# Patient Record
Sex: MALE | Race: WHITE | NOT HISPANIC OR LATINO | Employment: OTHER | URBAN - METROPOLITAN AREA
[De-identification: names, ages, dates, MRNs, and addresses within clinical notes are randomized per-mention and may not be internally consistent; named-entity substitution may affect disease eponyms.]

---

## 2023-04-14 LAB
ANION GAP IN SER/PLAS: 12 MMOL/L (ref 10–20)
CALCIUM (MG/DL) IN SER/PLAS: 8.8 MG/DL (ref 8.6–10.3)
CARBON DIOXIDE, TOTAL (MMOL/L) IN SER/PLAS: 28 MMOL/L (ref 21–32)
CHLORIDE (MMOL/L) IN SER/PLAS: 106 MMOL/L (ref 98–107)
CHOLESTEROL (MG/DL) IN SER/PLAS: 106 MG/DL (ref 0–199)
CHOLESTEROL IN HDL (MG/DL) IN SER/PLAS: 25.8 MG/DL
CHOLESTEROL/HDL RATIO: 4.1
CREATININE (MG/DL) IN SER/PLAS: 0.76 MG/DL (ref 0.5–1.3)
GFR MALE: >90 ML/MIN/1.73M2
GLUCOSE (MG/DL) IN SER/PLAS: 121 MG/DL (ref 74–99)
LDL: 55 MG/DL (ref 0–99)
POTASSIUM (MMOL/L) IN SER/PLAS: 3.9 MMOL/L (ref 3.5–5.3)
SODIUM (MMOL/L) IN SER/PLAS: 142 MMOL/L (ref 136–145)
TRIGLYCERIDE (MG/DL) IN SER/PLAS: 128 MG/DL (ref 0–149)
UREA NITROGEN (MG/DL) IN SER/PLAS: 15 MG/DL (ref 6–23)
VLDL: 26 MG/DL (ref 0–40)

## 2023-05-13 DIAGNOSIS — E78.2 MIXED HYPERLIPIDEMIA: ICD-10-CM

## 2023-05-13 DIAGNOSIS — I10 BENIGN ESSENTIAL HYPERTENSION: Primary | ICD-10-CM

## 2023-05-15 PROBLEM — N40.0 BPH WITHOUT URINARY OBSTRUCTION: Status: ACTIVE | Noted: 2023-05-15

## 2023-05-15 PROBLEM — E78.5 HYPERLIPEMIA: Status: ACTIVE | Noted: 2023-05-15

## 2023-05-15 PROBLEM — R73.03 PRE-DIABETES: Status: RESOLVED | Noted: 2023-05-15 | Resolved: 2023-05-15

## 2023-05-15 PROBLEM — E11.9 DIET-CONTROLLED DIABETES MELLITUS (MULTI): Status: ACTIVE | Noted: 2023-05-15

## 2023-05-15 PROBLEM — I10 BENIGN ESSENTIAL HYPERTENSION: Status: ACTIVE | Noted: 2023-05-15

## 2023-05-15 PROBLEM — R73.9 HYPERGLYCEMIA: Status: RESOLVED | Noted: 2023-05-15 | Resolved: 2023-05-15

## 2023-05-15 RX ORDER — METOPROLOL TARTRATE 50 MG/1
TABLET ORAL
Qty: 180 TABLET | Refills: 3 | Status: SHIPPED | OUTPATIENT
Start: 2023-05-15 | End: 2024-01-18 | Stop reason: SDUPTHER

## 2023-05-15 RX ORDER — ATORVASTATIN CALCIUM 40 MG/1
TABLET, FILM COATED ORAL
Qty: 90 TABLET | Refills: 3 | Status: SHIPPED | OUTPATIENT
Start: 2023-05-15 | End: 2024-05-23 | Stop reason: SDUPTHER

## 2023-06-19 DIAGNOSIS — I10 BENIGN ESSENTIAL HYPERTENSION: Primary | ICD-10-CM

## 2023-06-19 RX ORDER — LOSARTAN POTASSIUM AND HYDROCHLOROTHIAZIDE 12.5; 1 MG/1; MG/1
TABLET ORAL
Qty: 90 TABLET | Refills: 3 | Status: SHIPPED | OUTPATIENT
Start: 2023-06-19 | End: 2024-01-18 | Stop reason: SDUPTHER

## 2023-07-20 ENCOUNTER — TELEPHONE (OUTPATIENT)
Dept: PRIMARY CARE | Facility: CLINIC | Age: 68
End: 2023-07-20
Payer: MEDICARE

## 2023-07-20 DIAGNOSIS — Z12.11 ENCOUNTER FOR SCREENING COLONOSCOPY: Primary | ICD-10-CM

## 2023-07-20 NOTE — TELEPHONE ENCOUNTER
Patient is stating that Dr. Kim needs a  referral be sent for his colonoscopy.    Fax [phone]

## 2023-09-09 PROBLEM — R35.1 NOCTURIA: Status: ACTIVE | Noted: 2023-09-09

## 2023-09-09 PROBLEM — M75.42 IMPINGEMENT SYNDROME OF LEFT SHOULDER: Status: ACTIVE | Noted: 2023-09-09

## 2023-09-09 PROBLEM — G62.9 PERIPHERAL NEUROPATHY: Status: ACTIVE | Noted: 2023-09-09

## 2023-09-09 PROBLEM — G47.33 OBSTRUCTIVE SLEEP APNEA ON CPAP: Status: ACTIVE | Noted: 2023-09-09

## 2023-09-09 PROBLEM — R51.9 FREQUENT HEADACHES: Status: ACTIVE | Noted: 2023-09-09

## 2023-09-09 PROBLEM — R60.0 BILATERAL EDEMA OF LOWER EXTREMITY: Status: ACTIVE | Noted: 2023-09-09

## 2023-09-09 PROBLEM — E55.9 VITAMIN D DEFICIENCY: Status: ACTIVE | Noted: 2023-09-09

## 2023-09-09 PROBLEM — N52.9 ERECTILE DYSFUNCTION: Status: ACTIVE | Noted: 2023-09-09

## 2023-09-09 PROBLEM — J45.909 REACTIVE AIRWAY DISEASE (HHS-HCC): Status: ACTIVE | Noted: 2023-09-09

## 2023-09-09 PROBLEM — R06.2 WHEEZING: Status: ACTIVE | Noted: 2023-09-09

## 2023-09-09 PROBLEM — E78.6 LOW HDL (UNDER 40): Status: ACTIVE | Noted: 2023-09-09

## 2023-09-09 PROBLEM — M75.41 IMPINGEMENT SYNDROME OF RIGHT SHOULDER: Status: ACTIVE | Noted: 2023-09-09

## 2023-09-09 PROBLEM — R32 URINARY INCONTINENCE: Status: ACTIVE | Noted: 2023-09-09

## 2023-09-09 PROBLEM — Z86.010 HISTORY OF COLON POLYPS: Status: ACTIVE | Noted: 2023-09-09

## 2023-09-09 PROBLEM — Z95.5 PRESENCE OF STENT IN LAD CORONARY ARTERY: Status: ACTIVE | Noted: 2023-09-09

## 2023-09-09 PROBLEM — I77.810 MILD ASCENDING AORTA DILATATION (CMS-HCC): Status: ACTIVE | Noted: 2023-09-09

## 2023-09-09 PROBLEM — R07.81 RIB PAIN ON LEFT SIDE: Status: ACTIVE | Noted: 2023-09-09

## 2023-09-09 PROBLEM — R06.09 DYSPNEA ON MINIMAL EXERTION: Status: ACTIVE | Noted: 2023-09-09

## 2023-09-09 PROBLEM — M67.819 TENDINOSIS OF ROTATOR CUFF: Status: ACTIVE | Noted: 2023-09-09

## 2023-09-09 PROBLEM — M25.519 SHOULDER PAIN: Status: ACTIVE | Noted: 2023-09-09

## 2023-09-09 PROBLEM — G25.81 RESTLESS LEGS SYNDROME: Status: ACTIVE | Noted: 2023-09-09

## 2023-09-09 PROBLEM — I25.10 CORONARY ARTERY DISEASE: Status: ACTIVE | Noted: 2023-09-09

## 2023-09-09 PROBLEM — N39.41 SENSORY URGE INCONTINENCE: Status: ACTIVE | Noted: 2023-09-09

## 2023-09-09 PROBLEM — R35.0 URINE FREQUENCY: Status: ACTIVE | Noted: 2023-09-09

## 2023-09-09 PROBLEM — Z86.0100 HISTORY OF COLON POLYPS: Status: ACTIVE | Noted: 2023-09-09

## 2023-09-09 RX ORDER — VIBEGRON 75 MG/1
1 TABLET, FILM COATED ORAL DAILY
COMMUNITY
Start: 2022-09-23

## 2023-09-09 RX ORDER — METHYLPREDNISOLONE 4 MG/1
TABLET ORAL
COMMUNITY
Start: 2022-10-04 | End: 2024-04-02 | Stop reason: WASHOUT

## 2023-09-09 RX ORDER — MIRABEGRON 50 MG/1
1 TABLET, EXTENDED RELEASE ORAL DAILY
COMMUNITY
Start: 2022-08-03 | End: 2024-04-02 | Stop reason: WASHOUT

## 2023-09-09 RX ORDER — OXYBUTYNIN CHLORIDE 5 MG/1
1 TABLET, EXTENDED RELEASE ORAL DAILY
COMMUNITY
Start: 2021-08-31 | End: 2024-05-23 | Stop reason: WASHOUT

## 2023-09-09 RX ORDER — ASCORBIC ACID 500 MG
2 TABLET ORAL DAILY
COMMUNITY

## 2023-09-09 RX ORDER — FLUTICASONE PROPIONATE 50 MCG
SPRAY, SUSPENSION (ML) NASAL DAILY
COMMUNITY
Start: 2022-06-28 | End: 2024-05-23 | Stop reason: WASHOUT

## 2023-09-09 RX ORDER — ERGOCALCIFEROL 1.25 MG/1
1 CAPSULE ORAL WEEKLY
COMMUNITY
Start: 2019-04-05 | End: 2024-05-23 | Stop reason: SDUPTHER

## 2023-09-09 RX ORDER — ALBUTEROL SULFATE 90 UG/1
2 AEROSOL, METERED RESPIRATORY (INHALATION)
COMMUNITY
Start: 2021-08-31 | End: 2024-05-23 | Stop reason: WASHOUT

## 2023-09-09 RX ORDER — MULTIVITAMIN
1 TABLET ORAL DAILY
COMMUNITY
End: 2024-05-23 | Stop reason: WASHOUT

## 2023-09-09 RX ORDER — SILDENAFIL 100 MG/1
TABLET, FILM COATED ORAL AS NEEDED
COMMUNITY
Start: 2021-08-31

## 2023-09-09 RX ORDER — POLYETHYLENE GLYCOL 3350, SODIUM CHLORIDE, SODIUM BICARBONATE, POTASSIUM CHLORIDE 420; 11.2; 5.72; 1.48 G/4L; G/4L; G/4L; G/4L
4000 POWDER, FOR SOLUTION ORAL ONCE
COMMUNITY
End: 2024-05-23 | Stop reason: WASHOUT

## 2023-09-09 RX ORDER — AMLODIPINE BESYLATE 10 MG/1
1 TABLET ORAL DAILY
COMMUNITY
Start: 2018-04-23 | End: 2023-10-12 | Stop reason: SINTOL

## 2023-09-09 RX ORDER — ASPIRIN 81 MG/1
1 TABLET ORAL DAILY
COMMUNITY
Start: 2019-02-21

## 2023-09-09 RX ORDER — SPIRONOLACTONE 25 MG/1
1 TABLET ORAL DAILY
COMMUNITY
Start: 2023-04-06 | End: 2024-04-02 | Stop reason: SDUPTHER

## 2023-09-26 ENCOUNTER — HOSPITAL ENCOUNTER (OUTPATIENT)
Dept: DATA CONVERSION | Facility: HOSPITAL | Age: 68
End: 2023-09-26
Attending: INTERNAL MEDICINE | Admitting: INTERNAL MEDICINE
Payer: MEDICARE

## 2023-09-26 DIAGNOSIS — Z86.010 PERSONAL HISTORY OF COLONIC POLYPS: ICD-10-CM

## 2023-09-26 DIAGNOSIS — D12.4 BENIGN NEOPLASM OF DESCENDING COLON: ICD-10-CM

## 2023-09-26 DIAGNOSIS — K57.30 DIVERTICULOSIS OF LARGE INTESTINE WITHOUT PERFORATION OR ABSCESS WITHOUT BLEEDING: ICD-10-CM

## 2023-09-26 DIAGNOSIS — Z12.11 ENCOUNTER FOR SCREENING FOR MALIGNANT NEOPLASM OF COLON: ICD-10-CM

## 2023-09-26 DIAGNOSIS — K64.0 FIRST DEGREE HEMORRHOIDS: ICD-10-CM

## 2023-09-26 DIAGNOSIS — Z53.8 PROCEDURE AND TREATMENT NOT CARRIED OUT FOR OTHER REASONS: ICD-10-CM

## 2023-09-26 DIAGNOSIS — Z80.0 FAMILY HISTORY OF MALIGNANT NEOPLASM OF DIGESTIVE ORGANS: ICD-10-CM

## 2023-09-29 VITALS — HEIGHT: 68 IN | BODY MASS INDEX: 35.42 KG/M2 | WEIGHT: 233.69 LBS

## 2023-10-02 LAB
COMPLETE PATHOLOGY REPORT: NORMAL
CONVERTED CLINICAL DIAGNOSIS-HISTORY: NORMAL
CONVERTED FINAL DIAGNOSIS: NORMAL
CONVERTED FINAL REPORT PDF LINK TO COPY AND PASTE: NORMAL
CONVERTED GROSS DESCRIPTION: NORMAL
CONVERTED MICROSCOPIC DESCRIPTION: NORMAL

## 2023-10-12 ENCOUNTER — OFFICE VISIT (OUTPATIENT)
Dept: CARDIOLOGY | Facility: CLINIC | Age: 68
End: 2023-10-12
Payer: MEDICARE

## 2023-10-12 VITALS
DIASTOLIC BLOOD PRESSURE: 62 MMHG | HEART RATE: 68 BPM | BODY MASS INDEX: 34.6 KG/M2 | WEIGHT: 227 LBS | SYSTOLIC BLOOD PRESSURE: 102 MMHG

## 2023-10-12 DIAGNOSIS — I10 BENIGN ESSENTIAL HYPERTENSION: Primary | ICD-10-CM

## 2023-10-12 DIAGNOSIS — E78.2 MIXED HYPERLIPIDEMIA: ICD-10-CM

## 2023-10-12 DIAGNOSIS — I25.10 CORONARY ARTERY DISEASE INVOLVING NATIVE HEART WITHOUT ANGINA PECTORIS, UNSPECIFIED VESSEL OR LESION TYPE: ICD-10-CM

## 2023-10-12 DIAGNOSIS — I77.810 MILD ASCENDING AORTA DILATATION (CMS-HCC): Primary | ICD-10-CM

## 2023-10-12 DIAGNOSIS — I77.810 MILD ASCENDING AORTA DILATATION (CMS-HCC): ICD-10-CM

## 2023-10-12 DIAGNOSIS — Z95.5 PRESENCE OF STENT IN LAD CORONARY ARTERY: ICD-10-CM

## 2023-10-12 DIAGNOSIS — Q25.49 DILATATION OF AORTIC SINUS OF VALSALVA (HHS-HCC): ICD-10-CM

## 2023-10-12 PROCEDURE — 3074F SYST BP LT 130 MM HG: CPT | Performed by: PHYSICIAN ASSISTANT

## 2023-10-12 PROCEDURE — 99213 OFFICE O/P EST LOW 20 MIN: CPT | Performed by: PHYSICIAN ASSISTANT

## 2023-10-12 PROCEDURE — 3078F DIAST BP <80 MM HG: CPT | Performed by: PHYSICIAN ASSISTANT

## 2023-10-12 ASSESSMENT — ENCOUNTER SYMPTOMS
FEVER: 0
ORTHOPNEA: 0
DIARRHEA: 0
WEAKNESS: 0
SHORTNESS OF BREATH: 0
VOMITING: 0
NAUSEA: 0
WHEEZING: 0
DIZZINESS: 0
DYSURIA: 0
ABDOMINAL PAIN: 0
PALPITATIONS: 0

## 2023-10-12 NOTE — PATIENT INSTRUCTIONS
Please continue your current medications.  If you have any change in your cardiorespiratory status please call the office right away.  We will be arranging for a follow-up echocardiogram to reassess your ascending aorta.  We will arrange for follow-up with Dr. Sharp in 6 months time.

## 2023-10-12 NOTE — PROGRESS NOTES
Cardiology Follow Up  Chief Complaint:   Here for 6 month follow up.      History Of Present Illness:    Mr. Holt is a 67-year-old gentleman here for annual follow up. History of PCI to LAD in 2010 in the setting of angina. Denies chest pain or angina, shortness of breath, orthopnea, PND, palpitation, lightheadedness or loss of consciousness.     Has mild ankle swelling since starting amlodipine, that worsened after increasing this to 10 mg. He gets occasional wheezing but without shortness of breath.     Last year we did an echocardiogram that showed normal LV function, mild posterior LVH and mildly dilated ascending aorta measuring 3.9 cm.     He tells me that the blood pressure is generally well controlled.     EKG shows sinus rhythm.  Poor R wave progression unchanged from prior EKGs.  10-12-23: This is a very pleasant 68-year-old patient known to Dr. Sharp.  He presents today for stable 6-month follow-up with follow-up patient with above history.  States that he had a very active summer and had no exertional complaints or concerns such as chest pain palpitations shortness of breath.  He had been having some lower extremity edema and amlodipine was discontinued in favor of Aldactone and immediately the edema resolved and has not recurred.  He otherwise feels well with no recent hospitalizations or illnesses.  He voices no other new cardiac complaints or concerns.  Blood pressures when checked at home have been typically between 110 and 120 range.     Last Recorded Vitals:  Vitals:    10/12/23 0900   BP: 102/62   Pulse: 68   Weight: 103 kg (227 lb)       Past Medical History:  He has a past medical history of Chronic sinusitis, unspecified (02/27/2019), Encounter for screening for other viral diseases (09/06/2018), Hyperglycemia (05/15/2023), Other conditions influencing health status (08/23/2016), Personal history of other diseases of the nervous system and sense organs (06/14/2017), Personal history  of other diseases of the respiratory system (02/22/2022), Persons encountering health services in other specified circumstances (02/18/2019), Pre-diabetes (05/15/2023), and Trochanteric bursitis, right hip (09/06/2018).    Past Surgical History:  He has a past surgical history that includes Other surgical history (02/21/2019); Other surgical history (02/21/2019); Other surgical history (02/21/2019); Other surgical history (02/21/2019); and Other surgical history (02/21/2019).      Social History:  He has no history on file for tobacco use, alcohol use, and drug use.    Family History:  Family History   Problem Relation Name Age of Onset    Dementia Mother      Skin cancer Mother      Coronary artery disease Father      Colon cancer Father          Allergies:  Patient has no known allergies.    Outpatient Medications:  Current Outpatient Medications   Medication Instructions    albuterol 90 mcg/actuation inhaler 2 puffs, inhalation, 4 times daily RT, As directed    amLODIPine (Norvasc) 10 mg tablet 1 tablet, oral, Daily    ascorbic acid (Vitamin C) 500 mg tablet 2 tablets, oral, Daily    aspirin 81 mg EC tablet 1 tablet, oral, Daily    atorvastatin (Lipitor) 40 mg tablet TAKE 1 TABLET BY MOUTH AT  BEDTIME    ergocalciferol (Vitamin D-2) 1.25 MG (12278 UT) capsule 1 capsule, oral, Weekly    fluticasone (Flonase) 50 mcg/actuation nasal spray Each Nostril, Daily, 1-2 sprays    losartan-hydrochlorothiazide (Hyzaar) 100-12.5 mg tablet TAKE 1 TABLET BY MOUTH  DAILY    methylPREDNISolone (Medrol Dospak) 4 mg tablets oral, As directed    metoprolol tartrate (Lopressor) 50 mg tablet TAKE 1 TABLET BY MOUTH  EVERY 12 HOURS    mirabegron (Myrbetriq) 50 mg tablet extended release 24 hr 24 hr tablet 1 tablet, oral, Daily    multivitamin tablet 1 tablet, oral, Daily    oxybutynin XL (Ditropan-XL) 5 mg 24 hr tablet 1 tablet, oral, Daily    polyethylene glycol-electrolytes (Nulytely) 420 gram solution 4,000 mL, oral, Once, Mix as  directed and drink over 4hrs. Start at 4pm    sildenafil (Viagra) 100 mg tablet oral, As needed, 1/2-1 tab one hr before    spironolactone (Aldactone) 25 mg tablet 1 tablet, oral, Daily    vibegron (Gemtesa) 75 mg tablet 1 tablet, oral, Daily     Review of Systems   Constitutional: Negative for fever and malaise/fatigue.   Cardiovascular:  Negative for chest pain, orthopnea and palpitations.        LE edema resolved off Amlodipine     Respiratory:  Negative for shortness of breath and wheezing.    Skin:  Negative for itching and rash.   Gastrointestinal:  Negative for abdominal pain, diarrhea, nausea and vomiting.   Genitourinary:  Negative for dysuria.   Neurological:  Negative for dizziness and weakness.      Physical Exam  Constitutional:       General: He is not in acute distress.  HENT:      Mouth/Throat:      Mouth: Mucous membranes are moist.   Cardiovascular:      Rate and Rhythm: Normal rate and regular rhythm.      Heart sounds: Normal heart sounds. No murmur heard.     Comments: No LE edema  Pulmonary:      Effort: Pulmonary effort is normal.      Breath sounds: Normal breath sounds.   Abdominal:      General: Abdomen is flat. Bowel sounds are normal.      Palpations: Abdomen is soft.   Musculoskeletal:         General: No swelling.   Skin:     General: Skin is warm and dry.   Neurological:      Mental Status: He is alert and oriented to person, place, and time.   Psychiatric:         Mood and Affect: Mood normal.           Last Labs:  CBC -  Lab Results   Component Value Date    WBC 8.1 06/30/2022    HGB 13.8 06/30/2022    HCT 41.4 06/30/2022    MCV 90 06/30/2022     06/30/2022       CMP -  Lab Results   Component Value Date    CALCIUM 8.8 04/14/2023    PROT 6.2 (L) 06/30/2022    ALBUMIN 4.1 06/30/2022    AST 18 06/30/2022    ALT 24 06/30/2022    ALKPHOS 105 06/30/2022    BILITOT 2.0 (H) 06/30/2022       LIPID PANEL -   Lab Results   Component Value Date    CHOL 106 04/14/2023    TRIG 128  04/14/2023    HDL 25.8 (A) 04/14/2023    CHHDL 4.1 04/14/2023    LDLF 55 04/14/2023    VLDL 26 04/14/2023       RENAL FUNCTION PANEL -   Lab Results   Component Value Date    GLUCOSE 121 (H) 04/14/2023     04/14/2023    K 3.9 04/14/2023     04/14/2023    CO2 28 04/14/2023    ANIONGAP 12 04/14/2023    BUN 15 04/14/2023    CREATININE 0.76 04/14/2023    GFRMALE >90 04/14/2023    CALCIUM 8.8 04/14/2023    ALBUMIN 4.1 06/30/2022        Lab Results   Component Value Date    HGBA1C 6.7 08/04/2021       Last Cardiology Tests:    Echo:--9/22--CONCLUSIONS:   1. Left ventricular systolic function is normal with a 60% estimated ejection fraction.  Asc aorta on this echo 3.92cm    Stress Test:--9/21--IMPRESSION:  1. There is a small to moderate-sized fixed perfusion defect in the  inferior/inferolateral wall with normal wall motion suggestive  diaphragmatic attenuation. There is a low probability of ischemia.     2.  Calculated ejection fraction of 53% without segmental wall motion  abnormalities.      Lab review: I have personally reviewed the laboratory result(s).    Assessment/Plan   Problem List Items Addressed This Visit             ICD-10-CM       Cardiac and Vasculature    Benign essential hypertension - Primary I10    Relevant Orders    Follow Up In Cardiology    Hyperlipemia E78.5    Relevant Orders    Follow Up In Cardiology    Coronary artery disease I25.10    Relevant Orders    Follow Up In Cardiology    Mild ascending aorta dilatation (CMS/HCC) I77.810    Relevant Orders    Follow Up In Cardiology    Presence of stent in LAD coronary artery Z95.5    Relevant Orders    Follow Up In Cardiology      Hypertension--blood pressure is very well controlled on current medical regimen which we will continue.  Patient denies any need for refills.  Patient advised to avoid excess salt in his diet.   CAD--patient with history of stenting to the LAD way back in 2010.  Again very active over the summertime months with  no chest pain shortness of breath or palpitations.  No cardiac symptoms of concern.  Again we will continue current medical therapy and report any new issues to the office.  Dilated ascending aorta--last echocardiogram 1 year ago showed the ascending aorta at 3.92 cm.  Echo has been ordered for surveillance.  Hyperlipidemia--lipids 4/23  and LDL 55.  These results are very well controlled and he will continue current meds including his atorvastatin.  Overall patient doing very well.  No new cardiac complaints or concerns.  Again echo has been ordered for surveillance of his ascending aorta.  Previously preserved LV systolic function.  Patient is advised to contact the office of any change in cardiorespiratory status otherwise we will arrange for follow-up with Dr. Sharp in 6 months time.    Tani Burton PA-C  10/12/2023  9:21 AM

## 2023-10-24 ENCOUNTER — DOCUMENTATION (OUTPATIENT)
Dept: UROLOGY | Facility: CLINIC | Age: 68
End: 2023-10-24
Payer: MEDICARE

## 2023-10-25 ENCOUNTER — HOSPITAL ENCOUNTER (OUTPATIENT)
Dept: CARDIOLOGY | Facility: HOSPITAL | Age: 68
Discharge: HOME | End: 2023-10-25
Payer: MEDICARE

## 2023-10-25 DIAGNOSIS — Q25.49 DILATATION OF AORTIC SINUS OF VALSALVA (HHS-HCC): ICD-10-CM

## 2023-10-25 DIAGNOSIS — I77.810 MILD ASCENDING AORTA DILATATION (CMS-HCC): ICD-10-CM

## 2023-10-25 PROCEDURE — 93306 TTE W/DOPPLER COMPLETE: CPT | Performed by: INTERNAL MEDICINE

## 2023-10-27 LAB — EJECTION FRACTION APICAL 4 CHAMBER: 68.6

## 2023-12-13 ENCOUNTER — TELEPHONE (OUTPATIENT)
Dept: UROLOGY | Facility: CLINIC | Age: 68
End: 2023-12-13
Payer: MEDICARE

## 2023-12-13 NOTE — TELEPHONE ENCOUNTER
Patient calling to see if he is able to get more samples of Gemtesa.  His appt is after first of the year   Thank you

## 2023-12-20 ENCOUNTER — DOCUMENTATION (OUTPATIENT)
Dept: UROLOGY | Facility: CLINIC | Age: 68
End: 2023-12-20
Payer: MEDICARE

## 2024-01-16 ENCOUNTER — TELEPHONE (OUTPATIENT)
Dept: PRIMARY CARE | Facility: CLINIC | Age: 69
End: 2024-01-16
Payer: MEDICARE

## 2024-01-18 DIAGNOSIS — I10 BENIGN ESSENTIAL HYPERTENSION: ICD-10-CM

## 2024-01-24 DIAGNOSIS — M75.42 IMPINGEMENT SYNDROME OF LEFT SHOULDER: ICD-10-CM

## 2024-01-24 DIAGNOSIS — M75.41 IMPINGEMENT SYNDROME OF RIGHT SHOULDER: ICD-10-CM

## 2024-01-27 RX ORDER — METOPROLOL TARTRATE 50 MG/1
50 TABLET ORAL EVERY 12 HOURS
Qty: 180 TABLET | Refills: 0 | Status: SHIPPED | OUTPATIENT
Start: 2024-01-27 | End: 2024-05-23 | Stop reason: SDUPTHER

## 2024-01-27 RX ORDER — LOSARTAN POTASSIUM AND HYDROCHLOROTHIAZIDE 12.5; 1 MG/1; MG/1
1 TABLET ORAL DAILY
Qty: 90 TABLET | Refills: 0 | Status: SHIPPED | OUTPATIENT
Start: 2024-01-27 | End: 2024-05-23 | Stop reason: SDUPTHER

## 2024-01-29 ENCOUNTER — OFFICE VISIT (OUTPATIENT)
Dept: ORTHOPEDIC SURGERY | Facility: CLINIC | Age: 69
End: 2024-01-29
Payer: MEDICARE

## 2024-01-29 ENCOUNTER — HOSPITAL ENCOUNTER (OUTPATIENT)
Dept: RADIOLOGY | Facility: CLINIC | Age: 69
Discharge: HOME | End: 2024-01-29
Payer: MEDICARE

## 2024-01-29 VITALS — WEIGHT: 227 LBS | HEIGHT: 68 IN | BODY MASS INDEX: 34.4 KG/M2

## 2024-01-29 DIAGNOSIS — M75.41 IMPINGEMENT SYNDROME OF RIGHT SHOULDER: ICD-10-CM

## 2024-01-29 DIAGNOSIS — M75.42 IMPINGEMENT SYNDROME OF LEFT SHOULDER: ICD-10-CM

## 2024-01-29 DIAGNOSIS — M75.42 IMPINGEMENT SYNDROME OF LEFT SHOULDER: Primary | ICD-10-CM

## 2024-01-29 PROCEDURE — 20610 DRAIN/INJ JOINT/BURSA W/O US: CPT | Performed by: STUDENT IN AN ORGANIZED HEALTH CARE EDUCATION/TRAINING PROGRAM

## 2024-01-29 PROCEDURE — 73030 X-RAY EXAM OF SHOULDER: CPT | Mod: BILATERAL PROCEDURE | Performed by: RADIOLOGY

## 2024-01-29 PROCEDURE — 99214 OFFICE O/P EST MOD 30 MIN: CPT | Performed by: STUDENT IN AN ORGANIZED HEALTH CARE EDUCATION/TRAINING PROGRAM

## 2024-01-29 PROCEDURE — 73030 X-RAY EXAM OF SHOULDER: CPT | Mod: 50

## 2024-01-29 PROCEDURE — 1125F AMNT PAIN NOTED PAIN PRSNT: CPT | Performed by: STUDENT IN AN ORGANIZED HEALTH CARE EDUCATION/TRAINING PROGRAM

## 2024-01-29 PROCEDURE — 1160F RVW MEDS BY RX/DR IN RCRD: CPT | Performed by: STUDENT IN AN ORGANIZED HEALTH CARE EDUCATION/TRAINING PROGRAM

## 2024-01-29 PROCEDURE — 1159F MED LIST DOCD IN RCRD: CPT | Performed by: STUDENT IN AN ORGANIZED HEALTH CARE EDUCATION/TRAINING PROGRAM

## 2024-01-29 RX ADMIN — TRIAMCINOLONE ACETONIDE 40 MG: 40 INJECTION, SUSPENSION INTRA-ARTICULAR; INTRAMUSCULAR at 11:05

## 2024-01-29 RX ADMIN — LIDOCAINE HYDROCHLORIDE 2 ML: 10 INJECTION INFILTRATION; PERINEURAL at 11:05

## 2024-01-29 ASSESSMENT — PAIN SCALES - GENERAL: PAINLEVEL_OUTOF10: 1

## 2024-01-29 ASSESSMENT — PAIN - FUNCTIONAL ASSESSMENT: PAIN_FUNCTIONAL_ASSESSMENT: 0-10

## 2024-01-29 NOTE — PROGRESS NOTES
PRIMARY CARE PHYSICIAN: JASON Krause-CNP    ORTHOPAEDIC FOLLOW-UP: Shoulder Evaluation    ASSESSMENT & PLAN    Impression: 68 y.o. male with bilateral shoulder pain Secondary to shoulder impingement and rotator cuff tendinosis.    Plan:   I reviewed with the patient the nature of their diagnosis.  I reviewed their imaging studies with them.    Based on the history, physical exam and imaging studies above, the patient's presentation is consistent with the above diagnosis.  I had a long discussion with the patient regarding their presentation and the treatment options.  We discussed initial nonoperative versus operative management options as well as potential further diagnostic imaging.  Therefore I recommend continuing with initial nonoperative management starting with physical therapy and subacromial injections. The patient received bilateral subacromial corticosteroid injections as described above which he tolerated well. I provided him with a prescription for physical therapy. He will continue to take over-the-counter anti-inflammatories. He will avoid heavy lifting over his head as to not aggravate his rotator cuff. He will return to see me as needed.     Follow-Up: Patient will follow-up as needed     At the end of the visit, all questions were answered in full. The patient is in agreement with the plan and recommendations. They will call the office with any questions/concerns.    Note dictated with Yashi software. Completed without full typed error editing and sent to avoid delay.     SUBJECTIVE  CHIEF COMPLAINT:   Chief Complaint   Patient presents with    Right Shoulder - Pain    Left Shoulder - Pain        HPI: Esteban Holt is a 68 y.o. patient. Esteban Holt complains of bilateral shoulder pain.  He was injected last about a year ago.  He denies any new traumatic injury.  He does have pain in both shoulders that he localizes anterior laterally.  He has difficulty  with arm extended and overhead activities.  He had good effect from the previous corticosteroid injections and is requesting repeat injections today.    REVIEW OF SYSTEMS  Constitutional: See HPI for pain assessment, No significant weight loss, recent trauma  Cardiovascular: No chest pain, shortness of breath  Respiratory: No difficulty breathing, cough  Gastrointestinal: No nausea, vomiting, diarrhea, constipation  Musculoskeletal: Noted in HPI, positive for pain, restricted motion, stiffness  Integumentary: No rashes, easy bruising, redness   Neurological: no numbness or tingling in extremities, no gait disturbances   Psychiatric: No mood changes, memory changes, social issues  Heme/Lymph: no excessive swelling, easy bruising, excessive bleeding  ENT: No hearing changes  Eyes: No vision changes    Past Medical History:   Diagnosis Date    Chronic sinusitis, unspecified 02/27/2019    Sinobronchitis    Encounter for screening for other viral diseases 09/06/2018    Need for hepatitis C screening test    Hyperglycemia 05/15/2023    Other conditions influencing health status 08/23/2016    History of cough    Personal history of other diseases of the nervous system and sense organs 06/14/2017    History of sciatica    Personal history of other diseases of the respiratory system 02/22/2022    History of bronchitis    Persons encountering health services in other specified circumstances 02/18/2019    Encounter to establish care with new doctor    Pre-diabetes 05/15/2023    Trochanteric bursitis, right hip 09/06/2018    Trochanteric bursitis of right hip        No Known Allergies     Past Surgical History:   Procedure Laterality Date    OTHER SURGICAL HISTORY  02/21/2019    Hernia repair    OTHER SURGICAL HISTORY  02/21/2019    Knee surgery    OTHER SURGICAL HISTORY  02/21/2019    Trigger finger repair    OTHER SURGICAL HISTORY  02/21/2019    Tonsillectomy    OTHER SURGICAL HISTORY  02/21/2019    Plantar fasciotomy         Family History   Problem Relation Name Age of Onset    Dementia Mother      Skin cancer Mother      Coronary artery disease Father      Colon cancer Father          Social History     Socioeconomic History    Marital status:      Spouse name: Not on file    Number of children: Not on file    Years of education: Not on file    Highest education level: Not on file   Occupational History    Not on file   Tobacco Use    Smoking status: Not on file    Smokeless tobacco: Not on file   Substance and Sexual Activity    Alcohol use: Not on file    Drug use: Not on file    Sexual activity: Not on file   Other Topics Concern    Not on file   Social History Narrative    Not on file     Social Determinants of Health     Financial Resource Strain: Not on file   Food Insecurity: Not on file   Transportation Needs: Not on file   Physical Activity: Not on file   Stress: Not on file   Social Connections: Not on file   Intimate Partner Violence: Not on file   Housing Stability: Not on file        CURRENT MEDICATIONS:   Current Outpatient Medications   Medication Sig Dispense Refill    albuterol 90 mcg/actuation inhaler Inhale 2 puffs 4 times a day. As directed      ascorbic acid (Vitamin C) 500 mg tablet Take 2 tablets (1,000 mg) by mouth once daily.      aspirin 81 mg EC tablet Take 1 tablet (81 mg) by mouth once daily.      atorvastatin (Lipitor) 40 mg tablet TAKE 1 TABLET BY MOUTH AT  BEDTIME 90 tablet 3    ergocalciferol (Vitamin D-2) 1.25 MG (03836 UT) capsule Take 1 capsule (1,250 mcg) by mouth once a week.      fluticasone (Flonase) 50 mcg/actuation nasal spray Administer into each nostril once daily. 1-2 sprays      losartan-hydrochlorothiazide (Hyzaar) 100-12.5 mg tablet Take 1 tablet by mouth once daily. 90 tablet 0    methylPREDNISolone (Medrol Dospak) 4 mg tablets Take by mouth. As directed      metoprolol tartrate (Lopressor) 50 mg tablet Take 1 tablet by mouth every 12 hours. 180 tablet 0    mirabegron  "(Myrbetriq) 50 mg tablet extended release 24 hr 24 hr tablet Take 1 tablet (50 mg) by mouth once daily.      multivitamin tablet Take 1 tablet by mouth once daily.      oxybutynin XL (Ditropan-XL) 5 mg 24 hr tablet Take 1 tablet (5 mg) by mouth once daily.      polyethylene glycol-electrolytes (Nulytely) 420 gram solution Take 4,000 mL by mouth 1 time. Mix as directed and drink over 4hrs. Start at 4pm      sildenafil (Viagra) 100 mg tablet Take by mouth if needed for erectile dysfunction. 1/2-1 tab one hr before      spironolactone (Aldactone) 25 mg tablet Take 1 tablet (25 mg) by mouth once daily.      vibegron (Gemtesa) 75 mg tablet Take 1 tablet (75 mg) by mouth once daily.       No current facility-administered medications for this visit.        OBJECTIVE    PHYSICAL EXAM      9/23/2022     8:58 AM 10/18/2022     3:03 PM 11/2/2022    10:05 AM 4/6/2023     2:08 PM 5/12/2023     9:19 AM 9/26/2023     7:16 AM 10/12/2023     9:00 AM   Vitals   Systolic 152 122 145 152 159  102   Diastolic 88 70 83 80 82  62   Heart Rate 61 72 73 64 61  68   Temp  37.4 °C (99.4 °F)        Resp  16  16      Height (in) 1.727 m (5' 8\") 1.727 m (5' 8\") 1.727 m (5' 8\") 1.727 m (5' 8\") 1.727 m (5' 8\") 1.725 m (5' 7.91\")    Weight (lb) 232 232 232 235 235 233.69 227   BMI 35.28 kg/m2 35.28 kg/m2 35.28 kg/m2 35.73 kg/m2 35.73 kg/m2 35.62 kg/m2 34.6 kg/m2   BSA (m2) 2.24 m2 2.24 m2 2.24 m2 2.27 m2 2.27 m2 2.25 m2 2.22 m2   Visit Report       Report      There is no height or weight on file to calculate BMI.    GENERAL: A/Ox3, NAD. Appears healthy, well nourished  PSYCHIATRIC: Mood stable, appropriate memory recall  EYES: EOM intact, no scleral icterus  CARDIOVASCULAR: Palpable peripheral pulses  LUNGS: Breathing non-labored on room air  SKIN: no erythema, rashes, or ecchymosis     Negative Spurling's  Full painless neck range of motion     Detailed examination of the bilateral shoulders demonstrates:  Skin intact  No erythema or warmth  No " ecchymosis or soft tissue swelling  Mild TTP AC joint  Positive TTP biceps tendon  Positive TTP posterior lateral acromion  Mild scapular dyskinesia with repetitive forward flexion bilaterally  Range of motion:  Forward flexion 160/165 symmetric  ER 40/45 symmetric  IR upper lumbar symmetric  Positive Barriga and Neer's  Equivocal Jobes with 5â€“/5 strength and mild pain  ER strength 5â€“/5 with mild pain  Negative belly press, negative liftoff  Equivocal speeds  Upper extremity motor grossly intact  C5-T1 sensation intact bilaterally  2+ radial pulses bilaterally  Warm and well-perfused, brisk capillary refill     Imaging: Multiple views of the affected bilateral shoulder(s) demonstrate: AC joint osteoarthritis, minimal degenerative change of the glenohumeral joint, no acute osseous abnormality.   X-rays were personally reviewed and interpreted by me.  Radiology reports were reviewed by me as well, if readily available at the time.    L Inj/Asp: bilateral subacromial bursa on 1/29/2024 11:05 AM  Indications: pain  Details: 25 G needle, posterior approach  Medications (Right): 2 mL lidocaine 10 mg/mL (1 %); 40 mg triamcinolone acetonide 40 mg/mL  Medications (Left): 2 mL lidocaine 10 mg/mL (1 %); 40 mg triamcinolone acetonide 40 mg/mL  Outcome: tolerated well, no immediate complications  Procedure, treatment alternatives, risks and benefits explained, specific risks discussed. Consent was given by the patient. Immediately prior to procedure a time out was called to verify the correct patient, procedure, equipment, support staff and site/side marked as required. Patient was prepped and draped in the usual sterile fashion.                 Alex Wagner MD  Attending Surgeon    Sports Medicine Orthopaedic Surgery  [facility] [place]  [facility] [employer] [facility]

## 2024-02-02 RX ORDER — TRIAMCINOLONE ACETONIDE 40 MG/ML
40 INJECTION, SUSPENSION INTRA-ARTICULAR; INTRAMUSCULAR
Status: COMPLETED | OUTPATIENT
Start: 2024-01-29 | End: 2024-01-29

## 2024-02-02 RX ORDER — LIDOCAINE HYDROCHLORIDE 10 MG/ML
2 INJECTION INFILTRATION; PERINEURAL
Status: COMPLETED | OUTPATIENT
Start: 2024-01-29 | End: 2024-01-29

## 2024-02-07 ENCOUNTER — OFFICE VISIT (OUTPATIENT)
Dept: UROLOGY | Facility: CLINIC | Age: 69
End: 2024-02-07
Payer: MEDICARE

## 2024-02-07 DIAGNOSIS — N32.9 LESION OF BLADDER: ICD-10-CM

## 2024-02-07 DIAGNOSIS — R39.15 BENIGN PROSTATIC HYPERPLASIA (BPH) WITH URINARY URGENCY: Primary | ICD-10-CM

## 2024-02-07 DIAGNOSIS — N40.1 BENIGN PROSTATIC HYPERPLASIA (BPH) WITH URINARY URGENCY: Primary | ICD-10-CM

## 2024-02-07 DIAGNOSIS — Z12.5 SCREENING PSA (PROSTATE SPECIFIC ANTIGEN): ICD-10-CM

## 2024-02-07 PROCEDURE — 1159F MED LIST DOCD IN RCRD: CPT | Performed by: STUDENT IN AN ORGANIZED HEALTH CARE EDUCATION/TRAINING PROGRAM

## 2024-02-07 PROCEDURE — 99214 OFFICE O/P EST MOD 30 MIN: CPT | Performed by: STUDENT IN AN ORGANIZED HEALTH CARE EDUCATION/TRAINING PROGRAM

## 2024-02-07 PROCEDURE — 1160F RVW MEDS BY RX/DR IN RCRD: CPT | Performed by: STUDENT IN AN ORGANIZED HEALTH CARE EDUCATION/TRAINING PROGRAM

## 2024-02-07 PROCEDURE — 1125F AMNT PAIN NOTED PAIN PRSNT: CPT | Performed by: STUDENT IN AN ORGANIZED HEALTH CARE EDUCATION/TRAINING PROGRAM

## 2024-02-07 NOTE — PROGRESS NOTES
"[facility] - Dr. Scar Morfin    Established Patient  Visit    PCP: JASON Krause-TOM    Chief Complaint/Reason for visit: annual FU    HPI:   BPH with irritative LUTS:  On vibegron  Happy  No adverse effects    PSA screening:  Due for PSA   No new  symptoms  Will have done with PCP blood work   Lab Results   Component Value Date    PSA 3.34 06/30/2022    PSA 2.90 08/04/2021    PSA 2.43 06/22/2020       Bladder lesion?:  Concern for small lesion at bladder base last renal US (obtained for flank pain)  No gross hematuria    === 05/16/23 ===    US RENAL COMPLETE    - Impression -  Asymmetric size of the bilateral kidneys with the right kidney  measuring significantly smaller than the left.    Nonspecific 4 mm rounded density in the region of the bladder base.  Consider correlation with cystoscopy for exclusion of subtle  underlying lesion.    Otherwise, unremarkable renal ultrasound.        5/12/23  1. Left flank pain  Intermittent, 6/10 in severity, does not vary with movement  No hx kidney stones      2. BPH with irritative LUTS  Doing well on vibegron  Excellent result - 95% improvement  PVR = 23 ml  No weak stream or retention issues  Failed Ditropan and mirabegron   IPSS = 11 August 2022  IPSS = 13 Sept 2022     3. PSA screening  7/2/22 PSA was 3.34  no family history      11/2/22  1. BPH with irritative LUTS  Now s/p vibegron trial   Excellent result - 95% improvement  PVR = 23 ml  No weak stream or retention issues  Failed Ditropan and mirabegron   IPSS = 11 August 2022  IPSS = 13 Sept 2022     2. PSA screening  7/2/22 PSA was 3.34  no family history      9/23/22  1. BPH with irritative LUTS  Urgency, frequency every 2-3 hours, and nocturia 1-2 times.  Oxybutynin trial 6/28/22 no change in symptoms  Now s/p mirabegron trial - minimal improvement \"25%\"  IPSS = 11 August 2022  BP today = 152/88  PVR low   IPSS = 13; QoL = 5      2. PSA screening  7/2/22 PSA was 3.34  no family history     Past " Medical History:   Diagnosis Date    Chronic sinusitis, unspecified 02/27/2019    Sinobronchitis    Encounter for screening for other viral diseases 09/06/2018    Need for hepatitis C screening test    Hyperglycemia 05/15/2023    Other conditions influencing health status 08/23/2016    History of cough    Personal history of other diseases of the nervous system and sense organs 06/14/2017    History of sciatica    Personal history of other diseases of the respiratory system 02/22/2022    History of bronchitis    Persons encountering health services in other specified circumstances 02/18/2019    Encounter to establish care with new doctor    Pre-diabetes 05/15/2023    Trochanteric bursitis, right hip 09/06/2018    Trochanteric bursitis of right hip     Past Surgical History:   Procedure Laterality Date    OTHER SURGICAL HISTORY  02/21/2019    Hernia repair    OTHER SURGICAL HISTORY  02/21/2019    Knee surgery    OTHER SURGICAL HISTORY  02/21/2019    Trigger finger repair    OTHER SURGICAL HISTORY  02/21/2019    Tonsillectomy    OTHER SURGICAL HISTORY  02/21/2019    Plantar fasciotomy     Social History     Socioeconomic History    Marital status:      Spouse name: Not on file    Number of children: Not on file    Years of education: Not on file    Highest education level: Not on file   Occupational History    Not on file   Tobacco Use    Smoking status: Unknown    Smokeless tobacco: Not on file   Substance and Sexual Activity    Alcohol use: Not on file    Drug use: Not on file    Sexual activity: Not on file   Other Topics Concern    Not on file   Social History Narrative    Not on file     Social Determinants of Health     Financial Resource Strain: Not on file   Food Insecurity: Not on file   Transportation Needs: Not on file   Physical Activity: Not on file   Stress: Not on file   Social Connections: Not on file   Intimate Partner Violence: Not on file   Housing Stability: Not on file     Current  Outpatient Medications   Medication Instructions    albuterol 90 mcg/actuation inhaler 2 puffs, inhalation, 4 times daily RT, As directed    ascorbic acid (Vitamin C) 500 mg tablet 2 tablets, oral, Daily    aspirin 81 mg EC tablet 1 tablet, oral, Daily    atorvastatin (Lipitor) 40 mg tablet TAKE 1 TABLET BY MOUTH AT  BEDTIME    ergocalciferol (Vitamin D-2) 1.25 MG (01082 UT) capsule 1 capsule, oral, Weekly    fluticasone (Flonase) 50 mcg/actuation nasal spray Each Nostril, Daily, 1-2 sprays    losartan-hydrochlorothiazide (Hyzaar) 100-12.5 mg tablet 1 tablet, oral, Daily    methylPREDNISolone (Medrol Dospak) 4 mg tablets oral, As directed    metoprolol tartrate (LOPRESSOR) 50 mg, oral, Every 12 hours    mirabegron (Myrbetriq) 50 mg tablet extended release 24 hr 24 hr tablet 1 tablet, oral, Daily    multivitamin tablet 1 tablet, oral, Daily    oxybutynin XL (Ditropan-XL) 5 mg 24 hr tablet 1 tablet, oral, Daily    polyethylene glycol-electrolytes (Nulytely) 420 gram solution 4,000 mL, oral, Once, Mix as directed and drink over 4hrs. Start at 4pm    sildenafil (Viagra) 100 mg tablet oral, As needed, 1/2-1 tab one hr before    spironolactone (Aldactone) 25 mg tablet 1 tablet, oral, Daily    vibegron (Gemtesa) 75 mg tablet 1 tablet, oral, Daily     No Known Allergies       Physical Exam:  General: Alert, cooperative, no acute distress  Eyes: Sclera clear  Cardiac: Extremities are warm and well perfused  Lungs: Breathing non-labored. Speaking in clear and complete sentences.  MSK: Ambulatory with steady gait   Neuro: Alert and oriented to person, place, and time  Psych: Normal mood and affect  Skin: No obvious lesions or rashes    Assessment and Plan:    1. Benign prostatic hyperplasia (BPH) with urinary urgency  Stable on vibegron    2. Screening PSA (prostate specific antigen)  Due for PSA, ordered    3. Lesion of bladder  Small 4 mm on renal bladder US.   Recommend cystoscopy  Would like to schedule in [place]

## 2024-02-09 ENCOUNTER — DOCUMENTATION (OUTPATIENT)
Dept: UROLOGY | Facility: CLINIC | Age: 69
End: 2024-02-09
Payer: MEDICARE

## 2024-03-26 PROBLEM — N32.9 LESION OF URINARY BLADDER: Status: ACTIVE | Noted: 2024-03-26

## 2024-03-26 PROBLEM — M75.40 IMPINGEMENT SYNDROME OF SHOULDER REGION: Status: ACTIVE | Noted: 2024-03-26

## 2024-03-26 PROBLEM — E66.9 OBESITY WITH BODY MASS INDEX 30 OR GREATER: Status: ACTIVE | Noted: 2024-03-26

## 2024-03-26 PROBLEM — K57.30 DIVERTICULOSIS OF LARGE INTESTINE: Status: ACTIVE | Noted: 2023-09-26

## 2024-03-26 PROBLEM — R09.82 POSTNASAL DRIP: Status: ACTIVE | Noted: 2024-03-26

## 2024-03-26 PROBLEM — M67.80 TENDINOSIS: Status: ACTIVE | Noted: 2023-09-09

## 2024-03-26 PROBLEM — M25.512 PAIN OF LEFT SHOULDER REGION: Status: ACTIVE | Noted: 2023-09-09

## 2024-03-26 PROBLEM — Z80.0 FAMILY HISTORY OF MALIGNANT NEOPLASM OF DIGESTIVE ORGANS: Status: ACTIVE | Noted: 2023-09-26

## 2024-03-26 PROBLEM — D12.4 BENIGN NEOPLASM OF DESCENDING COLON: Status: ACTIVE | Noted: 2023-09-26

## 2024-03-26 PROBLEM — K64.0 GRADE I HEMORRHOIDS: Status: ACTIVE | Noted: 2023-09-26

## 2024-03-26 PROBLEM — J40 BRONCHITIS: Status: ACTIVE | Noted: 2024-03-26

## 2024-03-26 PROBLEM — Q25.49: Status: ACTIVE | Noted: 2024-03-26

## 2024-03-26 PROBLEM — D12.6 ADENOMATOUS POLYP OF COLON: Status: ACTIVE | Noted: 2024-03-26

## 2024-03-26 RX ORDER — NAPROXEN 500 MG/1
500 TABLET ORAL DAILY
COMMUNITY

## 2024-03-26 RX ORDER — FUROSEMIDE 20 MG/1
20 TABLET ORAL DAILY
COMMUNITY
Start: 2020-06-22 | End: 2024-04-02 | Stop reason: WASHOUT

## 2024-03-26 RX ORDER — VALSARTAN AND HYDROCHLOROTHIAZIDE 320; 12.5 MG/1; MG/1
1 TABLET, FILM COATED ORAL DAILY
COMMUNITY
Start: 2019-12-16 | End: 2024-04-02 | Stop reason: WASHOUT

## 2024-04-02 ENCOUNTER — OFFICE VISIT (OUTPATIENT)
Dept: CARDIOLOGY | Facility: CLINIC | Age: 69
End: 2024-04-02
Payer: MEDICARE

## 2024-04-02 VITALS
RESPIRATION RATE: 17 BRPM | OXYGEN SATURATION: 97 % | HEIGHT: 68 IN | HEART RATE: 62 BPM | WEIGHT: 230 LBS | SYSTOLIC BLOOD PRESSURE: 110 MMHG | DIASTOLIC BLOOD PRESSURE: 70 MMHG | BODY MASS INDEX: 34.86 KG/M2

## 2024-04-02 DIAGNOSIS — I77.810 ASCENDING AORTA DILATATION (CMS-HCC): ICD-10-CM

## 2024-04-02 DIAGNOSIS — I51.7 LVH (LEFT VENTRICULAR HYPERTROPHY): ICD-10-CM

## 2024-04-02 DIAGNOSIS — I25.10 CORONARY ARTERY DISEASE INVOLVING NATIVE HEART WITHOUT ANGINA PECTORIS, UNSPECIFIED VESSEL OR LESION TYPE: ICD-10-CM

## 2024-04-02 DIAGNOSIS — I10 BENIGN ESSENTIAL HYPERTENSION: Primary | ICD-10-CM

## 2024-04-02 DIAGNOSIS — I10 BENIGN ESSENTIAL HYPERTENSION: ICD-10-CM

## 2024-04-02 DIAGNOSIS — E78.5 HYPERLIPIDEMIA, UNSPECIFIED HYPERLIPIDEMIA TYPE: ICD-10-CM

## 2024-04-02 DIAGNOSIS — Z95.5 PRESENCE OF STENT IN ANTERIOR DESCENDING BRANCH OF LEFT CORONARY ARTERY: ICD-10-CM

## 2024-04-02 PROCEDURE — 93000 ELECTROCARDIOGRAM COMPLETE: CPT | Performed by: INTERNAL MEDICINE

## 2024-04-02 PROCEDURE — 1159F MED LIST DOCD IN RCRD: CPT | Performed by: INTERNAL MEDICINE

## 2024-04-02 PROCEDURE — 1160F RVW MEDS BY RX/DR IN RCRD: CPT | Performed by: INTERNAL MEDICINE

## 2024-04-02 PROCEDURE — 3078F DIAST BP <80 MM HG: CPT | Performed by: INTERNAL MEDICINE

## 2024-04-02 PROCEDURE — 3074F SYST BP LT 130 MM HG: CPT | Performed by: INTERNAL MEDICINE

## 2024-04-02 PROCEDURE — 99214 OFFICE O/P EST MOD 30 MIN: CPT | Performed by: INTERNAL MEDICINE

## 2024-04-02 PROCEDURE — 1036F TOBACCO NON-USER: CPT | Performed by: INTERNAL MEDICINE

## 2024-04-02 RX ORDER — SPIRONOLACTONE 25 MG/1
25 TABLET ORAL DAILY
Qty: 90 TABLET | Refills: 1 | Status: SHIPPED | OUTPATIENT
Start: 2024-04-02 | End: 2024-04-02 | Stop reason: SDUPTHER

## 2024-04-02 RX ORDER — SPIRONOLACTONE 25 MG/1
25 TABLET ORAL DAILY
Qty: 90 TABLET | Refills: 1 | Status: SHIPPED
Start: 2024-04-02 | End: 2024-05-23 | Stop reason: SDUPTHER

## 2024-04-02 NOTE — PROGRESS NOTES
"Chief Complaint:   Annual Exam     History Of Present Illness:    Esteban Holt is a 68 y.o. male presenting for annual follow up. History of PCI to LAD in 2010 in the setting of angina. Denies chest pain or angina, shortness of breath, orthopnea, PND, palpitation, lightheadedness or loss of consciousness.     Has mild ankle swelling since starting amlodipine, that worsened after increasing this to 10 mg. He gets occasional wheezing but without shortness of breath.  In 2023, will switch the amlodipine to spironolactone and hydrochlorothiazide leading to improvement in the ankle swelling and blood pressure.    He had an echo in October 2023 to monitor ascending aortic aneurysm that did not show any aneurysm however there was moderate degree of LVH with normal LV function.     In 2022, we did an echocardiogram that showed normal LV function, mild posterior LVH and mildly dilated ascending aorta measuring 3.9 cm.        EKG shows sinus rhythm.  Poor R wave progression unchanged from prior EKGs.  Low voltage complexes.     Last Recorded Vitals:  Vitals:    04/02/24 1219   BP: 110/70   Pulse: 62   Resp: 17   SpO2: 97%   Weight: 104 kg (230 lb)   Height: 1.727 m (5' 8\")       Past Medical History:  He has a past medical history of Chronic sinusitis, unspecified (02/27/2019), Encounter for screening for other viral diseases (09/06/2018), Hyperglycemia (05/15/2023), Other conditions influencing health status (08/23/2016), Personal history of other diseases of the nervous system and sense organs (06/14/2017), Personal history of other diseases of the respiratory system (02/22/2022), Persons encountering health services in other specified circumstances (02/18/2019), Pre-diabetes (05/15/2023), and Trochanteric bursitis, right hip (09/06/2018).    Past Surgical History:  He has a past surgical history that includes Other surgical history (02/21/2019); Other surgical history (02/21/2019); Other surgical history " (02/21/2019); Other surgical history (02/21/2019); and Other surgical history (02/21/2019).      Social History:  He reports that he has never smoked. He has never used smokeless tobacco. He reports that he does not drink alcohol and does not use drugs.    Family History:  Family History   Problem Relation Name Age of Onset    Dementia Mother      Skin cancer Mother      Coronary artery disease Father      Colon cancer Father          Allergies:  Patient has no known allergies.    Outpatient Medications:  Current Outpatient Medications   Medication Instructions    albuterol 90 mcg/actuation inhaler 2 puffs, inhalation, 4 times daily RT, As directed    ascorbic acid (Vitamin C) 500 mg tablet 2 tablets, oral, Daily    aspirin 81 mg EC tablet 1 tablet, oral, Daily    atorvastatin (Lipitor) 40 mg tablet TAKE 1 TABLET BY MOUTH AT  BEDTIME    ergocalciferol (Vitamin D-2) 1.25 MG (45200 UT) capsule 1 capsule, oral, Weekly    fluticasone (Flonase) 50 mcg/actuation nasal spray Each Nostril, Daily, 1-2 sprays    furosemide (LASIX) 20 mg, oral, Daily    losartan-hydrochlorothiazide (Hyzaar) 100-12.5 mg tablet 1 tablet, oral, Daily    methylPREDNISolone (Medrol Dospak) 4 mg tablets oral, As directed    metoprolol tartrate (LOPRESSOR) 50 mg, oral, Every 12 hours    mirabegron (Myrbetriq) 50 mg tablet extended release 24 hr 24 hr tablet 1 tablet, oral, Daily    multivitamin tablet 1 tablet, oral, Daily    naproxen (NAPROSYN) 500 mg, oral, Daily    oxybutynin XL (Ditropan-XL) 5 mg 24 hr tablet 1 tablet, oral, Daily    polyethylene glycol-electrolytes (Nulytely) 420 gram solution 4,000 mL, oral, Once, Mix as directed and drink over 4hrs. Start at 4pm    sildenafil (Viagra) 100 mg tablet oral, As needed, 1/2-1 tab one hr before    spironolactone (Aldactone) 25 mg tablet 1 tablet, oral, Daily    valsartan-hydrochlorothiazide (Diovan-HCT) 320-12.5 mg tablet 1 tablet, oral, Daily    vibegron (Gemtesa) 75 mg tablet 1 tablet, oral, Daily        Physical Exam:  Physical Exam  Vitals reviewed.   Constitutional:       Appearance: Normal appearance.   Neck:      Vascular: No carotid bruit or JVD.   Cardiovascular:      Rate and Rhythm: Normal rate and regular rhythm.      Heart sounds: Normal heart sounds, S1 normal and S2 normal. No murmur heard.     Comments: Trace ankle swelling present bilaterally.  Pulmonary:      Effort: Pulmonary effort is normal.      Breath sounds: Normal breath sounds.   Abdominal:      General: Abdomen is flat. Bowel sounds are normal.      Palpations: Abdomen is soft.   Musculoskeletal:      Right lower leg: No edema.      Left lower leg: No edema.   Skin:     General: Skin is warm.   Neurological:      Mental Status: He is alert. Mental status is at baseline.   Psychiatric:         Mood and Affect: Mood normal.         Behavior: Behavior normal.           Last Labs:  CBC -  Lab Results   Component Value Date    WBC 8.1 06/30/2022    HGB 13.8 06/30/2022    HCT 41.4 06/30/2022    MCV 90 06/30/2022     06/30/2022       CMP -  Lab Results   Component Value Date    CALCIUM 8.8 04/14/2023    PROT 6.2 (L) 06/30/2022    ALBUMIN 4.1 06/30/2022    AST 18 06/30/2022    ALT 24 06/30/2022    ALKPHOS 105 06/30/2022    BILITOT 2.0 (H) 06/30/2022       LIPID PANEL -   Lab Results   Component Value Date    CHOL 106 04/14/2023    TRIG 128 04/14/2023    HDL 25.8 (A) 04/14/2023    CHHDL 4.1 04/14/2023    LDLF 55 04/14/2023    VLDL 26 04/14/2023       RENAL FUNCTION PANEL -   Lab Results   Component Value Date    GLUCOSE 121 (H) 04/14/2023     04/14/2023    K 3.9 04/14/2023     04/14/2023    CO2 28 04/14/2023    ANIONGAP 12 04/14/2023    BUN 15 04/14/2023    CREATININE 0.76 04/14/2023    GFRMALE >90 04/14/2023    CALCIUM 8.8 04/14/2023    ALBUMIN 4.1 06/30/2022        Lab Results   Component Value Date    HGBA1C 6.7 08/04/2021       Last Cardiology Tests:  ECG:  No results found for this or any previous visit from the past  "1095 days.      Echo:  Transthoracic echo (TTE) complete 10/25/2023      Ejection Fractions:  No results found for: \"EF\"    Cath:  No results found for this or any previous visit from the past 1095 days.      Stress Test:  Nuclear Stress Test 09/15/2021      Cardiac Imaging:  No results found for this or any previous visit from the past 1095 days.          Assessment/Plan     In summary Mr. Holt is a 68-year-old gentleman with coronary artery disease. Currently doing well on medical therapy.      Blood pressure now improved.  Continue current medical therapy.  Blood pressure goals discussed.Check potassium levels.    CAD-Stable-also repeat lipid profile. Last year lipid profile was favorable.     He has a mildly dilated ascending aorta for which we will arrange follow-up with our [facility] in 6 months for monitoring.  This did not appear to be dilated in the echo done in 2023 but will reevaluate in 2024.    He will require potassium level checked in 6 months for which she will follow-up with our SONIA.    He has moderate LVH which I expect is going to improve with improvement in blood pressure.  We will repeat an echo with strain prior to follow-up in a year.               Favio Sharp MD  "

## 2024-05-23 ENCOUNTER — OFFICE VISIT (OUTPATIENT)
Dept: PRIMARY CARE | Facility: CLINIC | Age: 69
End: 2024-05-23
Payer: MEDICARE

## 2024-05-23 VITALS
WEIGHT: 230 LBS | OXYGEN SATURATION: 96 % | BODY MASS INDEX: 34.86 KG/M2 | SYSTOLIC BLOOD PRESSURE: 112 MMHG | HEIGHT: 68 IN | RESPIRATION RATE: 16 BRPM | HEART RATE: 66 BPM | DIASTOLIC BLOOD PRESSURE: 64 MMHG

## 2024-05-23 DIAGNOSIS — E11.9 DIABETIC ON DIET ONLY (MULTI): ICD-10-CM

## 2024-05-23 DIAGNOSIS — E78.5 HYPERLIPIDEMIA, UNSPECIFIED HYPERLIPIDEMIA TYPE: ICD-10-CM

## 2024-05-23 DIAGNOSIS — I77.810 ASCENDING AORTA DILATATION (CMS-HCC): ICD-10-CM

## 2024-05-23 DIAGNOSIS — E55.9 VITAMIN D DEFICIENCY: ICD-10-CM

## 2024-05-23 DIAGNOSIS — E78.2 MIXED HYPERLIPIDEMIA: ICD-10-CM

## 2024-05-23 DIAGNOSIS — I10 BENIGN ESSENTIAL HYPERTENSION: ICD-10-CM

## 2024-05-23 DIAGNOSIS — Z00.00 ROUTINE GENERAL MEDICAL EXAMINATION AT A HEALTH CARE FACILITY: ICD-10-CM

## 2024-05-23 DIAGNOSIS — Z00.00 ROUTINE GENERAL MEDICAL EXAMINATION AT HEALTH CARE FACILITY: Primary | ICD-10-CM

## 2024-05-23 DIAGNOSIS — I25.10 CORONARY ARTERY DISEASE INVOLVING NATIVE HEART WITHOUT ANGINA PECTORIS, UNSPECIFIED VESSEL OR LESION TYPE: ICD-10-CM

## 2024-05-23 DIAGNOSIS — R35.1 NOCTURIA: ICD-10-CM

## 2024-05-23 LAB — POC HEMOGLOBIN A1C: 7.3 % (ref 4.2–6.5)

## 2024-05-23 PROCEDURE — 3078F DIAST BP <80 MM HG: CPT | Performed by: FAMILY MEDICINE

## 2024-05-23 PROCEDURE — 1159F MED LIST DOCD IN RCRD: CPT | Performed by: FAMILY MEDICINE

## 2024-05-23 PROCEDURE — 3074F SYST BP LT 130 MM HG: CPT | Performed by: FAMILY MEDICINE

## 2024-05-23 PROCEDURE — 1123F ACP DISCUSS/DSCN MKR DOCD: CPT | Performed by: FAMILY MEDICINE

## 2024-05-23 PROCEDURE — 1036F TOBACCO NON-USER: CPT | Performed by: FAMILY MEDICINE

## 2024-05-23 PROCEDURE — 83036 HEMOGLOBIN GLYCOSYLATED A1C: CPT | Performed by: FAMILY MEDICINE

## 2024-05-23 PROCEDURE — 1160F RVW MEDS BY RX/DR IN RCRD: CPT | Performed by: FAMILY MEDICINE

## 2024-05-23 PROCEDURE — 1158F ADVNC CARE PLAN TLK DOCD: CPT | Performed by: FAMILY MEDICINE

## 2024-05-23 PROCEDURE — 1170F FXNL STATUS ASSESSED: CPT | Performed by: FAMILY MEDICINE

## 2024-05-23 PROCEDURE — G0439 PPPS, SUBSEQ VISIT: HCPCS | Performed by: FAMILY MEDICINE

## 2024-05-23 RX ORDER — LOSARTAN POTASSIUM AND HYDROCHLOROTHIAZIDE 12.5; 1 MG/1; MG/1
1 TABLET ORAL DAILY
Qty: 90 TABLET | Refills: 3 | Status: SHIPPED | OUTPATIENT
Start: 2024-05-23 | End: 2025-05-23

## 2024-05-23 RX ORDER — METOPROLOL TARTRATE 50 MG/1
50 TABLET ORAL EVERY 12 HOURS
Qty: 180 TABLET | Refills: 3 | Status: SHIPPED | OUTPATIENT
Start: 2024-05-23

## 2024-05-23 RX ORDER — SPIRONOLACTONE 25 MG/1
25 TABLET ORAL DAILY
Qty: 90 TABLET | Refills: 3 | Status: SHIPPED | OUTPATIENT
Start: 2024-05-23 | End: 2025-05-23

## 2024-05-23 RX ORDER — ATORVASTATIN CALCIUM 40 MG/1
40 TABLET, FILM COATED ORAL NIGHTLY
Qty: 90 TABLET | Refills: 3 | Status: SHIPPED | OUTPATIENT
Start: 2024-05-23

## 2024-05-23 RX ORDER — ERGOCALCIFEROL 1.25 MG/1
1 CAPSULE ORAL
Qty: 12 CAPSULE | Refills: 3 | Status: SHIPPED | OUTPATIENT
Start: 2024-05-26 | End: 2025-05-26

## 2024-05-23 ASSESSMENT — ENCOUNTER SYMPTOMS
DEPRESSION: 0
OCCASIONAL FEELINGS OF UNSTEADINESS: 0
LOSS OF SENSATION IN FEET: 0

## 2024-05-23 ASSESSMENT — ACTIVITIES OF DAILY LIVING (ADL)
GROCERY_SHOPPING: INDEPENDENT
MANAGING_FINANCES: INDEPENDENT
DOING_HOUSEWORK: INDEPENDENT
TAKING_MEDICATION: INDEPENDENT
DRESSING: INDEPENDENT
BATHING: INDEPENDENT

## 2024-05-23 ASSESSMENT — PATIENT HEALTH QUESTIONNAIRE - PHQ9
1. LITTLE INTEREST OR PLEASURE IN DOING THINGS: NOT AT ALL
SUM OF ALL RESPONSES TO PHQ9 QUESTIONS 1 AND 2: 0
2. FEELING DOWN, DEPRESSED OR HOPELESS: NOT AT ALL

## 2024-05-23 NOTE — PROGRESS NOTES
"Subjective   Reason for Visit: Esteban Holt is an 68 y.o. male here for a Medicare Wellness visit.   Wears CPAP nightly and  gets relief from that- wears nightly.   Past Medical, Surgical, and Family History reviewed and updated in chart.    Reviewed all medications by prescribing practitioner or clinical pharmacist (such as prescriptions, OTCs, herbal therapies and supplements) and documented in the medical record.    HPI  No concerns,alford annual checkiin   Patient Care Team:  Trini Trevino MD as PCP - General (Family Medicine)  Sona Ritchie DO as PCP - [employer] PCP     Review of Systems   All other systems reviewed and are negative.      Objective   Vitals:  /64   Pulse 66   Resp 16   Ht 1.727 m (5' 8\")   Wt 104 kg (230 lb)   SpO2 96%   BMI 34.97 kg/m²       Physical Exam  Vitals reviewed.   Constitutional:       Appearance: Normal appearance.   HENT:      Head: Normocephalic and atraumatic.      Right Ear: Tympanic membrane normal.      Left Ear: Tympanic membrane normal.      Nose: Nose normal.      Mouth/Throat:      Mouth: Mucous membranes are moist.      Pharynx: Oropharynx is clear.   Eyes:      Extraocular Movements: Extraocular movements intact.      Conjunctiva/sclera: Conjunctivae normal.      Pupils: Pupils are equal, round, and reactive to light.   Cardiovascular:      Rate and Rhythm: Normal rate and regular rhythm.      Pulses: Normal pulses.      Heart sounds: Normal heart sounds.   Pulmonary:      Effort: Pulmonary effort is normal.      Breath sounds: Normal breath sounds.   Abdominal:      General: Abdomen is flat. Bowel sounds are normal.      Palpations: Abdomen is soft.   Musculoskeletal:         General: Normal range of motion.      Cervical back: Normal range of motion and neck supple.   Skin:     General: Skin is warm and dry.      Capillary Refill: Capillary refill takes less than 2 seconds.   Neurological:      General: No focal deficit " present.      Mental Status: He is alert and oriented to person, place, and time.   Psychiatric:         Mood and Affect: Mood normal.         Behavior: Behavior normal.         Assessment/Plan   Problem List Items Addressed This Visit       Benign essential hypertension    Relevant Medications    ergocalciferol (Vitamin D-2) 1.25 MG (13537 UT) capsule (Start on 5/26/2024)    losartan-hydrochlorothiazide (Hyzaar) 100-12.5 mg tablet    metoprolol tartrate (Lopressor) 50 mg tablet    spironolactone (Aldactone) 25 mg tablet    Other Relevant Orders    CBC and Auto Differential    TSH with reflex to Free T4 if abnormal    Diabetic on diet only (Multi)    Hyperlipidemia    Relevant Medications    atorvastatin (Lipitor) 40 mg tablet    Other Relevant Orders    Comprehensive Metabolic Panel    Lipid Panel    Coronary artery disease    Relevant Medications    metoprolol tartrate (Lopressor) 50 mg tablet    Ascending aorta dilatation (CMS-HCC)    Nocturia    Relevant Orders    Prostate Specific Antigen    Vitamin D deficiency    Relevant Medications    ergocalciferol (Vitamin D-2) 1.25 MG (04051 UT) capsule (Start on 5/26/2024)     Other Visit Diagnoses       Routine general medical examination at health care facility    -  Primary    Routine general medical examination at a health care facility        Relevant Orders    POCT glycosylated hemoglobin (Hb A1C) manually resulted (Completed)        Watch sugars and weight, as you're A1C is abit up.   Recheck annually, discussed Prevnar 20.

## 2024-05-23 NOTE — PATIENT INSTRUCTIONS
Assessment/Plan   Problem List Items Addressed This Visit       Benign essential hypertension    Relevant Medications    ergocalciferol (Vitamin D-2) 1.25 MG (42613 UT) capsule (Start on 5/26/2024)    losartan-hydrochlorothiazide (Hyzaar) 100-12.5 mg tablet    metoprolol tartrate (Lopressor) 50 mg tablet    spironolactone (Aldactone) 25 mg tablet    Other Relevant Orders    CBC and Auto Differential    TSH with reflex to Free T4 if abnormal    Diabetic on diet only (Multi)    Hyperlipidemia    Relevant Medications    atorvastatin (Lipitor) 40 mg tablet    Other Relevant Orders    Comprehensive Metabolic Panel    Lipid Panel    Coronary artery disease    Relevant Medications    metoprolol tartrate (Lopressor) 50 mg tablet    Ascending aorta dilatation (CMS-HCC)    Nocturia    Relevant Orders    Prostate Specific Antigen    Vitamin D deficiency    Relevant Medications    ergocalciferol (Vitamin D-2) 1.25 MG (66476 UT) capsule (Start on 5/26/2024)     Other Visit Diagnoses       Routine general medical examination at health care facility    -  Primary    Routine general medical examination at a health care facility        Relevant Orders    POCT glycosylated hemoglobin (Hb A1C) manually resulted (Completed)        Watch sugars and weight, as you're A1C is abit up.   Recheck annually, discussed Prevnar 20.

## 2024-05-24 ENCOUNTER — LAB (OUTPATIENT)
Dept: LAB | Facility: LAB | Age: 69
End: 2024-05-24
Payer: MEDICARE

## 2024-05-24 DIAGNOSIS — Z12.5 SCREENING PSA (PROSTATE SPECIFIC ANTIGEN): ICD-10-CM

## 2024-05-24 DIAGNOSIS — I25.10 CORONARY ARTERY DISEASE INVOLVING NATIVE HEART WITHOUT ANGINA PECTORIS, UNSPECIFIED VESSEL OR LESION TYPE: ICD-10-CM

## 2024-05-24 DIAGNOSIS — I10 BENIGN ESSENTIAL HYPERTENSION: ICD-10-CM

## 2024-05-24 DIAGNOSIS — R35.1 NOCTURIA: ICD-10-CM

## 2024-05-24 DIAGNOSIS — E78.5 HYPERLIPIDEMIA, UNSPECIFIED HYPERLIPIDEMIA TYPE: ICD-10-CM

## 2024-05-24 LAB
ALBUMIN SERPL BCP-MCNC: 4.1 G/DL (ref 3.4–5)
ALP SERPL-CCNC: 104 U/L (ref 33–136)
ALT SERPL W P-5'-P-CCNC: 21 U/L (ref 10–52)
ANION GAP SERPL CALC-SCNC: 12 MMOL/L (ref 10–20)
AST SERPL W P-5'-P-CCNC: 15 U/L (ref 9–39)
BASOPHILS # BLD AUTO: 0.05 X10*3/UL (ref 0–0.1)
BASOPHILS NFR BLD AUTO: 0.6 %
BILIRUB SERPL-MCNC: 1.4 MG/DL (ref 0–1.2)
BUN SERPL-MCNC: 19 MG/DL (ref 6–23)
CALCIUM SERPL-MCNC: 9 MG/DL (ref 8.6–10.3)
CHLORIDE SERPL-SCNC: 104 MMOL/L (ref 98–107)
CHOLEST SERPL-MCNC: 105 MG/DL (ref 0–199)
CHOLESTEROL/HDL RATIO: 4
CO2 SERPL-SCNC: 28 MMOL/L (ref 21–32)
CREAT SERPL-MCNC: 0.88 MG/DL (ref 0.5–1.3)
EGFRCR SERPLBLD CKD-EPI 2021: >90 ML/MIN/1.73M*2
EOSINOPHIL # BLD AUTO: 0.2 X10*3/UL (ref 0–0.7)
EOSINOPHIL NFR BLD AUTO: 2.4 %
ERYTHROCYTE [DISTWIDTH] IN BLOOD BY AUTOMATED COUNT: 13.3 % (ref 11.5–14.5)
GLUCOSE SERPL-MCNC: 138 MG/DL (ref 74–99)
HCT VFR BLD AUTO: 40 % (ref 41–52)
HDLC SERPL-MCNC: 26.4 MG/DL
HGB BLD-MCNC: 12.9 G/DL (ref 13.5–17.5)
IMM GRANULOCYTES # BLD AUTO: 0.08 X10*3/UL (ref 0–0.7)
IMM GRANULOCYTES NFR BLD AUTO: 0.9 % (ref 0–0.9)
LDLC SERPL CALC-MCNC: 56 MG/DL
LYMPHOCYTES # BLD AUTO: 1.83 X10*3/UL (ref 1.2–4.8)
LYMPHOCYTES NFR BLD AUTO: 21.6 %
MCH RBC QN AUTO: 30.1 PG (ref 26–34)
MCHC RBC AUTO-ENTMCNC: 32.3 G/DL (ref 32–36)
MCV RBC AUTO: 94 FL (ref 80–100)
MONOCYTES # BLD AUTO: 0.72 X10*3/UL (ref 0.1–1)
MONOCYTES NFR BLD AUTO: 8.5 %
NEUTROPHILS # BLD AUTO: 5.59 X10*3/UL (ref 1.2–7.7)
NEUTROPHILS NFR BLD AUTO: 66 %
NON HDL CHOLESTEROL: 79 MG/DL (ref 0–149)
NRBC BLD-RTO: 0 /100 WBCS (ref 0–0)
PLATELET # BLD AUTO: 266 X10*3/UL (ref 150–450)
POTASSIUM SERPL-SCNC: 3.9 MMOL/L (ref 3.5–5.3)
PROT SERPL-MCNC: 5.8 G/DL (ref 6.4–8.2)
PSA SERPL-MCNC: 2.67 NG/ML
RBC # BLD AUTO: 4.28 X10*6/UL (ref 4.5–5.9)
SODIUM SERPL-SCNC: 140 MMOL/L (ref 136–145)
TRIGL SERPL-MCNC: 115 MG/DL (ref 0–149)
TSH SERPL-ACNC: 1.87 MIU/L (ref 0.44–3.98)
VLDL: 23 MG/DL (ref 0–40)
WBC # BLD AUTO: 8.5 X10*3/UL (ref 4.4–11.3)

## 2024-05-24 PROCEDURE — 84443 ASSAY THYROID STIM HORMONE: CPT

## 2024-05-24 PROCEDURE — 80061 LIPID PANEL: CPT

## 2024-05-24 PROCEDURE — 85025 COMPLETE CBC W/AUTO DIFF WBC: CPT

## 2024-05-24 PROCEDURE — G0103 PSA SCREENING: HCPCS

## 2024-05-24 PROCEDURE — 36415 COLL VENOUS BLD VENIPUNCTURE: CPT

## 2024-05-24 PROCEDURE — 80053 COMPREHEN METABOLIC PANEL: CPT

## 2024-06-05 NOTE — RESULT ENCOUNTER NOTE
Esteban, labwork looked pretty good. You were slightly anemic (hemoglobin low) , and we should check this annually. Otherwise labs were essentially normal.

## 2024-09-17 PROBLEM — R73.03 PREDIABETES: Status: ACTIVE | Noted: 2024-09-17

## 2024-09-17 RX ORDER — MIRABEGRON 50 MG/1
50 TABLET, FILM COATED, EXTENDED RELEASE ORAL DAILY
COMMUNITY
Start: 2022-08-03 | End: 2024-10-10 | Stop reason: WASHOUT

## 2024-10-10 ENCOUNTER — OFFICE VISIT (OUTPATIENT)
Dept: CARDIOLOGY | Facility: HOSPITAL | Age: 69
End: 2024-10-10
Payer: MEDICARE

## 2024-10-10 ENCOUNTER — APPOINTMENT (OUTPATIENT)
Dept: CARDIOLOGY | Facility: CLINIC | Age: 69
End: 2024-10-10
Payer: MEDICARE

## 2024-10-10 VITALS
BODY MASS INDEX: 35.46 KG/M2 | DIASTOLIC BLOOD PRESSURE: 62 MMHG | OXYGEN SATURATION: 95 % | HEIGHT: 68 IN | HEART RATE: 71 BPM | SYSTOLIC BLOOD PRESSURE: 116 MMHG | WEIGHT: 234 LBS

## 2024-10-10 DIAGNOSIS — I25.10 CORONARY ARTERY DISEASE INVOLVING NATIVE HEART WITHOUT ANGINA PECTORIS, UNSPECIFIED VESSEL OR LESION TYPE: ICD-10-CM

## 2024-10-10 DIAGNOSIS — I10 BENIGN ESSENTIAL HYPERTENSION: ICD-10-CM

## 2024-10-10 DIAGNOSIS — I77.810 ASCENDING AORTA DILATATION (CMS-HCC): Primary | ICD-10-CM

## 2024-10-10 DIAGNOSIS — E78.5 HYPERLIPIDEMIA, UNSPECIFIED HYPERLIPIDEMIA TYPE: ICD-10-CM

## 2024-10-10 DIAGNOSIS — Z95.5 PRESENCE OF STENT IN ANTERIOR DESCENDING BRANCH OF LEFT CORONARY ARTERY: ICD-10-CM

## 2024-10-10 PROCEDURE — 1036F TOBACCO NON-USER: CPT | Performed by: PHYSICIAN ASSISTANT

## 2024-10-10 PROCEDURE — 1123F ACP DISCUSS/DSCN MKR DOCD: CPT | Performed by: PHYSICIAN ASSISTANT

## 2024-10-10 PROCEDURE — 1160F RVW MEDS BY RX/DR IN RCRD: CPT | Performed by: PHYSICIAN ASSISTANT

## 2024-10-10 PROCEDURE — 3078F DIAST BP <80 MM HG: CPT | Performed by: PHYSICIAN ASSISTANT

## 2024-10-10 PROCEDURE — 3008F BODY MASS INDEX DOCD: CPT | Performed by: PHYSICIAN ASSISTANT

## 2024-10-10 PROCEDURE — 99213 OFFICE O/P EST LOW 20 MIN: CPT | Performed by: PHYSICIAN ASSISTANT

## 2024-10-10 PROCEDURE — 1159F MED LIST DOCD IN RCRD: CPT | Performed by: PHYSICIAN ASSISTANT

## 2024-10-10 PROCEDURE — 3074F SYST BP LT 130 MM HG: CPT | Performed by: PHYSICIAN ASSISTANT

## 2024-10-10 PROCEDURE — 3048F LDL-C <100 MG/DL: CPT | Performed by: PHYSICIAN ASSISTANT

## 2024-10-10 ASSESSMENT — ENCOUNTER SYMPTOMS
FEVER: 0
VOMITING: 0
ABDOMINAL PAIN: 0
NAUSEA: 0
DYSURIA: 0
PALPITATIONS: 0
WHEEZING: 0
ORTHOPNEA: 0
DIARRHEA: 0
SHORTNESS OF BREATH: 0
WEAKNESS: 0

## 2024-10-10 NOTE — PROGRESS NOTES
"Cardiology Follow Up  Chief Complaint:   Patient is here for 6 month office visit.      History Of Present Illness:    Esteban Holt is a 68 y.o. male presenting for annual follow up. History of PCI to LAD in 2010 in the setting of angina. Denies chest pain or angina, shortness of breath, orthopnea, PND, palpitation, lightheadedness or loss of consciousness.     Has mild ankle swelling since starting amlodipine, that worsened after increasing this to 10 mg. He gets occasional wheezing but without shortness of breath.  In 2023, will switch the amlodipine to spironolactone and hydrochlorothiazide leading to improvement in the ankle swelling and blood pressure.     He had an echo in October 2023 to monitor ascending aortic aneurysm that did not show any aneurysm however there was moderate degree of LVH with normal LV function.     In 2022, we did an echocardiogram that showed normal LV function, mild posterior LVH and mildly dilated ascending aorta measuring 3.9 cm.        EKG shows sinus rhythm.  Poor R wave progression unchanged from prior EKGs.  Low voltage complexes.  10-10-24: This a very pleasant 69-year-old patient known to Dr. Sharp.  Presents for 6-month follow-up.  Above history as noted.  Patient states that over the summer very active without any exertional complaints or concerns.  He is due for a surveillance echocardiogram to assess his ascending aorta as we have had some varying measurements on his ascending aorta previously at 3.92 cm but more recently was just at 3.4 cm.  He has no symptoms of chest pain back pain shortness of breath palpitations or any other concerning cardiac symptoms.  Again very active with no complaints.     Last Recorded Vitals:  Vitals:    10/10/24 1231   BP: 116/62   BP Location: Left arm   Patient Position: Sitting   BP Cuff Size: Adult   Pulse: 71   SpO2: 95%   Weight: 106 kg (234 lb)   Height: 1.727 m (5' 8\")       Past Medical History:  He has a past medical " history of Chronic sinusitis, unspecified (02/27/2019), Encounter for screening for other viral diseases (09/06/2018), Hyperglycemia (05/15/2023), Other conditions influencing health status (08/23/2016), Personal history of other diseases of the nervous system and sense organs (06/14/2017), Personal history of other diseases of the respiratory system (02/22/2022), Persons encountering health services in other specified circumstances (02/18/2019), Pre-diabetes (05/15/2023), and Trochanteric bursitis, right hip (09/06/2018).    Past Surgical History:  He has a past surgical history that includes Other surgical history (02/21/2019); Other surgical history (02/21/2019); Other surgical history (02/21/2019); Other surgical history (02/21/2019); and Other surgical history (02/21/2019).      Social History:  He reports that he has never smoked. He has never used smokeless tobacco. He reports that he does not drink alcohol and does not use drugs.    Family History:  Family History   Problem Relation Name Age of Onset    Dementia Mother      Skin cancer Mother      Coronary artery disease Father      Colon cancer Father          Allergies:  Patient has no known allergies.    Outpatient Medications:  Current Outpatient Medications   Medication Instructions    ascorbic acid (Vitamin C) 500 mg tablet 2 tablets, oral, Daily    aspirin 81 mg EC tablet 1 tablet, oral, Daily    atorvastatin (LIPITOR) 40 mg, oral, Nightly    losartan-hydrochlorothiazide (Hyzaar) 100-12.5 mg tablet 1 tablet, oral, Daily    metoprolol tartrate (LOPRESSOR) 50 mg, oral, Every 12 hours    naproxen (NAPROSYN) 500 mg, oral, Daily    sildenafil (Viagra) 100 mg tablet oral, As needed, 1/2-1 tab one hr before    spironolactone (ALDACTONE) 25 mg, oral, Daily    vibegron (Gemtesa) 75 mg tablet 1 tablet, oral, Daily     Review of Systems   Constitutional: Negative for fever and malaise/fatigue.   Cardiovascular:  Negative for chest pain, orthopnea and  palpitations.   Respiratory:  Negative for shortness of breath and wheezing.    Skin:  Negative for itching and rash.   Gastrointestinal:  Negative for abdominal pain, diarrhea, nausea and vomiting.   Genitourinary:  Negative for dysuria.   Neurological:  Negative for weakness.      Physical Exam  Constitutional:       General: He is not in acute distress.     Appearance: Normal appearance.   HENT:      Mouth/Throat:      Mouth: Mucous membranes are moist.   Neck:      Comments: No JVD  Cardiovascular:      Rate and Rhythm: Normal rate and regular rhythm.      Heart sounds: Normal heart sounds. No murmur heard.  Pulmonary:      Effort: Pulmonary effort is normal.      Breath sounds: Normal breath sounds.   Abdominal:      General: Abdomen is flat. Bowel sounds are normal.      Palpations: Abdomen is soft.   Musculoskeletal:         General: No swelling.   Skin:     General: Skin is warm and dry.   Neurological:      Mental Status: He is alert and oriented to person, place, and time.   Psychiatric:         Mood and Affect: Mood normal.           Last Labs:  CBC -  Lab Results   Component Value Date    WBC 8.5 05/24/2024    HGB 12.9 (L) 05/24/2024    HCT 40.0 (L) 05/24/2024    MCV 94 05/24/2024     05/24/2024       CMP -  Lab Results   Component Value Date    CALCIUM 9.0 05/24/2024    PROT 5.8 (L) 05/24/2024    ALBUMIN 4.1 05/24/2024    AST 15 05/24/2024    ALT 21 05/24/2024    ALKPHOS 104 05/24/2024    BILITOT 1.4 (H) 05/24/2024       LIPID PANEL -   Lab Results   Component Value Date    CHOL 105 05/24/2024    TRIG 115 05/24/2024    HDL 26.4 05/24/2024    CHHDL 4.0 05/24/2024    LDLF 55 04/14/2023    VLDL 23 05/24/2024    NHDL 79 05/24/2024       RENAL FUNCTION PANEL -   Lab Results   Component Value Date    GLUCOSE 138 (H) 05/24/2024     05/24/2024    K 3.9 05/24/2024     05/24/2024    CO2 28 05/24/2024    ANIONGAP 12 05/24/2024    BUN 19 05/24/2024    CREATININE 0.88 05/24/2024    GFRMALE >90  04/14/2023    CALCIUM 9.0 05/24/2024    ALBUMIN 4.1 05/24/2024        Lab Results   Component Value Date    HGBA1C 7.3 (A) 05/23/2024       Last Cardiology Tests:    Echo:  Transthoracic echo (TTE) complete 10/25/2023--CONCLUSIONS:   1. Left ventricular systolic function is normal with a 60-65% estimated ejection fraction.   2. Spectral Doppler shows an impaired relaxation pattern of left ventricular diastolic filling.   3. There is moderate concentric left ventricular hypertrophy.    Lab review: I have personally reviewed the laboratory result(s).    Assessment/Plan   Problem List Items Addressed This Visit             ICD-10-CM       Cardiac and Vasculature    Benign essential hypertension I10    Hyperlipidemia E78.5    Coronary artery disease I25.10    Ascending aorta dilatation (CMS-HCC) - Primary I77.810    Presence of stent in anterior descending branch of left coronary artery Z95.5   ASHD--patient with prior history of stenting without chest pain or anginal symptoms.  Blood pressure is very well-controlled as are his lipids.  For now we will continue current medical therapy  Hypertension--again blood pressure is very well-controlled on current meds.  Patient denies the need for any medication refills.  Patient advised to avoid excess salt in diet.  Hyperlipidemia--excellent control of lipids on current dose of statin.  Dilated ascending aorta--most recent measurement was 3.4 cm and is due for surveillance echo in April.  He is otherwise scheduled back with Dr. Sharp in April as well.  Should the be any change in cardiorespiratory status he is instructed to contact the office.      Tani Burton PA-C  10/10/2024  12:41 PM

## 2024-10-10 NOTE — PATIENT INSTRUCTIONS
Please continue your current medications.  If you have any change in your cardiorespiratory status please call the office right away.  Please keep your appointment with Dr. Sharp in April.

## 2024-11-15 DIAGNOSIS — I10 BENIGN ESSENTIAL HYPERTENSION: ICD-10-CM

## 2024-11-18 RX ORDER — LOSARTAN POTASSIUM AND HYDROCHLOROTHIAZIDE 12.5; 1 MG/1; MG/1
1 TABLET ORAL DAILY
Qty: 90 TABLET | Refills: 3 | Status: SHIPPED | OUTPATIENT
Start: 2024-11-18

## 2025-01-06 ENCOUNTER — APPOINTMENT (OUTPATIENT)
Dept: ORTHOPEDIC SURGERY | Facility: CLINIC | Age: 70
End: 2025-01-06
Payer: MEDICARE

## 2025-01-06 ENCOUNTER — HOSPITAL ENCOUNTER (OUTPATIENT)
Dept: RADIOLOGY | Facility: CLINIC | Age: 70
Discharge: HOME | End: 2025-01-06
Payer: MEDICARE

## 2025-01-06 VITALS — WEIGHT: 230 LBS | BODY MASS INDEX: 34.86 KG/M2 | HEIGHT: 68 IN

## 2025-01-06 DIAGNOSIS — M25.522 LEFT ELBOW PAIN: ICD-10-CM

## 2025-01-06 DIAGNOSIS — M77.12 LATERAL EPICONDYLITIS OF LEFT ELBOW: Primary | ICD-10-CM

## 2025-01-06 PROCEDURE — 3008F BODY MASS INDEX DOCD: CPT | Performed by: STUDENT IN AN ORGANIZED HEALTH CARE EDUCATION/TRAINING PROGRAM

## 2025-01-06 PROCEDURE — 1160F RVW MEDS BY RX/DR IN RCRD: CPT | Performed by: STUDENT IN AN ORGANIZED HEALTH CARE EDUCATION/TRAINING PROGRAM

## 2025-01-06 PROCEDURE — 1159F MED LIST DOCD IN RCRD: CPT | Performed by: STUDENT IN AN ORGANIZED HEALTH CARE EDUCATION/TRAINING PROGRAM

## 2025-01-06 PROCEDURE — 20605 DRAIN/INJ JOINT/BURSA W/O US: CPT | Performed by: STUDENT IN AN ORGANIZED HEALTH CARE EDUCATION/TRAINING PROGRAM

## 2025-01-06 PROCEDURE — 73080 X-RAY EXAM OF ELBOW: CPT | Mod: LEFT SIDE | Performed by: RADIOLOGY

## 2025-01-06 PROCEDURE — 99214 OFFICE O/P EST MOD 30 MIN: CPT | Performed by: STUDENT IN AN ORGANIZED HEALTH CARE EDUCATION/TRAINING PROGRAM

## 2025-01-06 PROCEDURE — 1036F TOBACCO NON-USER: CPT | Performed by: STUDENT IN AN ORGANIZED HEALTH CARE EDUCATION/TRAINING PROGRAM

## 2025-01-06 PROCEDURE — 73080 X-RAY EXAM OF ELBOW: CPT | Mod: LT

## 2025-01-06 PROCEDURE — 1123F ACP DISCUSS/DSCN MKR DOCD: CPT | Performed by: STUDENT IN AN ORGANIZED HEALTH CARE EDUCATION/TRAINING PROGRAM

## 2025-01-06 RX ADMIN — LIDOCAINE HYDROCHLORIDE 2 ML: 10 INJECTION, SOLUTION INFILTRATION; PERINEURAL at 09:30

## 2025-01-06 ASSESSMENT — PAIN - FUNCTIONAL ASSESSMENT: PAIN_FUNCTIONAL_ASSESSMENT: NO/DENIES PAIN

## 2025-01-06 NOTE — PROGRESS NOTES
PRIMARY CARE PHYSICIAN: Trini Trevino MD  REFERRING PROVIDER: No referring provider defined for this encounter.     CONSULT ORTHOPAEDIC: Elbow Evaluation    ASSESSMENT & PLAN    Impression: 69 y.o. male with left elbow lateral epicondylitis.    Plan:   I explained to the patient the nature of their diagnosis.  I reviewed their imaging studies with them.    Based on the history, physical exam and imaging studies above, the patient's presentation is consistent with the above diagnosis.  I had a long discussion with the patient regarding their presentation and the treatment options.  We discussed initial nonoperative versus operative management options as well as potential further diagnostic imaging.  I reviewed the patient's x-ray findings with him.  We discussed his treatment options going forward.  His examination and presentation are most consistent with lateral epicondylitis.  We discussed initial nonoperative management.  I provided him with a corticosteroid injection into his left elbow extensor wad/lateral epicondylitis and performed needling of this location.  He tolerated this well.  He will work on gentle wrist and forearm soft tissue anti-inflammatory modalities and stretching.  He will ice and rest the elbow as needed.  He can take anti-inflammatories as needed.  He will return to see me as needed.    Follow-Up: Patient will follow-up as needed    At the end of the visit, all questions were answered in full. The patient is in agreement with the plan and recommendations. They will call the office with any questions/concerns.    Note dictated with [employer] software. Completed without full typed error editing and sent to avoid delay.     SUBJECTIVE  CHIEF COMPLAINT:   Chief Complaint   Patient presents with    Left Elbow - Pain        HPI: Esteban Holt is a 69 y.o. patient who presents today with left elbow problems for the past 6-8 months. Pain is localized as posterolateral.  Pain is when he is flexing the elbow or when he has pressure on it.  He is difficulty with gripping.  He has pain with wrist extension.  No Hx of trauma, Sx, or injections to the area. He is a diabetic. XR done today..     They deny any associated neck pain.  No numbness, tingling, or paresthesias.    REVIEW OF SYSTEMS  Constitutional: See HPI for pain assessment, No significant weight loss, recent trauma  Cardiovascular: No chest pain, shortness of breath  Respiratory: No difficulty breathing, cough  Gastrointestinal: No nausea, vomiting, diarrhea, constipation  Musculoskeletal: Noted in HPI, positive for pain, restricted motion, stiffness  Integumentary: No rashes, easy bruising, redness   Neurological: no numbness or tingling in extremities, no gait disturbances   Psychiatric: No mood changes, memory changes, social issues  Heme/Lymph: no excessive swelling, easy bruising, excessive bleeding  ENT: No hearing changes  Eyes: No vision changes    Past Medical History:   Diagnosis Date    Chronic sinusitis, unspecified 02/27/2019    Sinobronchitis    Encounter for screening for other viral diseases 09/06/2018    Need for hepatitis C screening test    Hyperglycemia 05/15/2023    Other conditions influencing health status 08/23/2016    History of cough    Personal history of other diseases of the nervous system and sense organs 06/14/2017    History of sciatica    Personal history of other diseases of the respiratory system 02/22/2022    History of bronchitis    Persons encountering health services in other specified circumstances 02/18/2019    Encounter to establish care with new doctor    Pre-diabetes 05/15/2023    Trochanteric bursitis, right hip 09/06/2018    Trochanteric bursitis of right hip        No Known Allergies     Past Surgical History:   Procedure Laterality Date    OTHER SURGICAL HISTORY  02/21/2019    Hernia repair    OTHER SURGICAL HISTORY  02/21/2019    Knee surgery    OTHER SURGICAL HISTORY   02/21/2019    Trigger finger repair    OTHER SURGICAL HISTORY  02/21/2019    Tonsillectomy    OTHER SURGICAL HISTORY  02/21/2019    Plantar fasciotomy        Family History   Problem Relation Name Age of Onset    Dementia Mother      Skin cancer Mother      Coronary artery disease Father      Colon cancer Father          Social History     Socioeconomic History    Marital status:      Spouse name: Not on file    Number of children: Not on file    Years of education: Not on file    Highest education level: Not on file   Occupational History    Not on file   Tobacco Use    Smoking status: Never    Smokeless tobacco: Never   Vaping Use    Vaping status: Never Used   Substance and Sexual Activity    Alcohol use: Never    Drug use: Never    Sexual activity: Defer   Other Topics Concern    Not on file   Social History Narrative    Not on file     Social Drivers of Health     Financial Resource Strain: Not on file   Food Insecurity: Not on file   Transportation Needs: Not on file   Physical Activity: Not on file   Stress: Not on file   Social Connections: Not on file   Intimate Partner Violence: Not on file   Housing Stability: Not on file        CURRENT MEDICATIONS:   Current Outpatient Medications   Medication Sig Dispense Refill    ascorbic acid (Vitamin C) 500 mg tablet Take 2 tablets (1,000 mg) by mouth once daily.      aspirin 81 mg EC tablet Take 1 tablet (81 mg) by mouth once daily.      atorvastatin (Lipitor) 40 mg tablet Take 1 tablet (40 mg) by mouth once daily at bedtime. 90 tablet 3    losartan-hydrochlorothiazide (Hyzaar) 100-12.5 mg tablet TAKE ONE TABLET BY MOUTH EVERY DAY 90 tablet 3    metoprolol tartrate (Lopressor) 50 mg tablet Take 1 tablet by mouth every 12 hours. 180 tablet 3    naproxen (Naprosyn) 500 mg tablet Take 1 tablet (500 mg) by mouth once daily.      sildenafil (Viagra) 100 mg tablet Take by mouth if needed for erectile dysfunction. 1/2-1 tab one hr before      spironolactone  "(Aldactone) 25 mg tablet Take 1 tablet (25 mg) by mouth once daily. 90 tablet 3    vibegron (Gemtesa) 75 mg tablet Take 1 tablet (75 mg) by mouth once daily.       No current facility-administered medications for this visit.        OBJECTIVE    PHYSICAL EXAM      5/12/2023     9:19 AM 9/26/2023     7:16 AM 10/12/2023     9:00 AM 1/29/2024    10:43 AM 4/2/2024    12:19 PM 5/23/2024    11:53 AM 10/10/2024    12:31 PM   Vitals   Systolic 159  102  110 112 116   Diastolic 82  62  70 64 62   BP Location       Left arm   Heart Rate 61  68  62 66 71   Resp     17 16    Height 1.727 m (5' 8\") 1.725 m (5' 7.91\")  1.727 m (5' 8\") 1.727 m (5' 8\") 1.727 m (5' 8\") 1.727 m (5' 8\")   Weight (lb) 235 233.69 227 227 230 230 234   BMI 35.73 kg/m2 35.62 kg/m2 34.6 kg/m2 34.52 kg/m2 34.97 kg/m2 34.97 kg/m2 35.58 kg/m2   BSA (m2) 2.27 m2 2.25 m2 2.22 m2 2.22 m2 2.23 m2 2.23 m2 2.26 m2   Visit Report   Report Report Report Report Report      There is no height or weight on file to calculate BMI.    GENERAL: A/Ox3, NAD. Appears healthy, well nourished  PSYCHIATRIC: Mood stable, appropriate memory recall  EYES: EOM intact, no scleral icterus  CARDIOVASCULAR: Palpable peripheral pulses  LUNGS: Breathing non-labored on room air  SKIN: no erythema, rashes, or ecchymosis     MUSCULOSKELETAL:  Laterality: left Elbow Exam  - Negative Spurlings, full painless neck and shoulder ROM  - Skin intact  - No erythema or warmth  - No ecchymosis or soft tissue swelling  - Elbow ROM: 0 - 0 - 130, pronation/supination 85/85 symmetric  - Strength:      Flexion 5-/5     Extension 5-/5     Pronation/supination 5-/5  - Palpation: Positive tenderness at the lateral epicondyle of the distal humerus  - Stability: Varus/valgus stable  - Special Tests: Pain with resisted wrist and finger extension    NEUROVASCULAR:  - Neurovascular Status: sensation intact to light touch distally, upper extremity motor grossly intact  - Capillary refill brisk at extremities, " Bilateral peripheral pulses 2+    Imaging: Multiple views of the affected left elbow(s) demonstrate: No significant osseous normality, no fracture, minimal degenerative change.   Images were personally reviewed and interpreted by me.  Radiology reports were reviewed by me as well, if readily available at the time.    M Inj/Asp: L elbow on 1/6/2025 9:30 AM  Indications: pain  Details: 25 G needle, lateral approach  Medications: 10 mg triamcinolone acetonide 10 mg/mL; 2 mL lidocaine 10 mg/mL (1 %)  Outcome: tolerated well, no immediate complications  Procedure, treatment alternatives, risks and benefits explained, specific risks discussed. Consent was given by the patient. Immediately prior to procedure a time out was called to verify the correct patient, procedure, equipment, support staff and site/side marked as required. Patient was prepped and draped in the usual sterile fashion.               Alex Wagner MD  Attending Surgeon    Sports Medicine Orthopaedic Surgery  [facility]  [facility] [employer]

## 2025-01-10 RX ORDER — LIDOCAINE HYDROCHLORIDE 10 MG/ML
2 INJECTION, SOLUTION INFILTRATION; PERINEURAL
Status: COMPLETED | OUTPATIENT
Start: 2025-01-06 | End: 2025-01-06

## 2025-03-31 ENCOUNTER — APPOINTMENT (OUTPATIENT)
Dept: ORTHOPEDIC SURGERY | Facility: CLINIC | Age: 70
End: 2025-03-31
Payer: MEDICARE

## 2025-03-31 DIAGNOSIS — M75.41 IMPINGEMENT SYNDROME OF RIGHT SHOULDER: ICD-10-CM

## 2025-03-31 DIAGNOSIS — M75.42 IMPINGEMENT SYNDROME OF LEFT SHOULDER: Primary | ICD-10-CM

## 2025-03-31 PROCEDURE — 1036F TOBACCO NON-USER: CPT | Performed by: STUDENT IN AN ORGANIZED HEALTH CARE EDUCATION/TRAINING PROGRAM

## 2025-03-31 PROCEDURE — 1159F MED LIST DOCD IN RCRD: CPT | Performed by: STUDENT IN AN ORGANIZED HEALTH CARE EDUCATION/TRAINING PROGRAM

## 2025-03-31 PROCEDURE — 1160F RVW MEDS BY RX/DR IN RCRD: CPT | Performed by: STUDENT IN AN ORGANIZED HEALTH CARE EDUCATION/TRAINING PROGRAM

## 2025-03-31 PROCEDURE — 1123F ACP DISCUSS/DSCN MKR DOCD: CPT | Performed by: STUDENT IN AN ORGANIZED HEALTH CARE EDUCATION/TRAINING PROGRAM

## 2025-03-31 PROCEDURE — 99214 OFFICE O/P EST MOD 30 MIN: CPT | Performed by: STUDENT IN AN ORGANIZED HEALTH CARE EDUCATION/TRAINING PROGRAM

## 2025-03-31 PROCEDURE — 20610 DRAIN/INJ JOINT/BURSA W/O US: CPT | Performed by: STUDENT IN AN ORGANIZED HEALTH CARE EDUCATION/TRAINING PROGRAM

## 2025-03-31 RX ADMIN — TRIAMCINOLONE ACETONIDE 40 MG: 40 INJECTION, SUSPENSION INTRA-ARTICULAR; INTRAMUSCULAR at 11:00

## 2025-03-31 RX ADMIN — LIDOCAINE HYDROCHLORIDE 2 ML: 10 INJECTION, SOLUTION INFILTRATION; PERINEURAL at 11:00

## 2025-03-31 NOTE — PROGRESS NOTES
PRIMARY CARE PHYSICIAN: Trini Trevino MD    ORTHOPAEDIC FOLLOW-UP: Shoulder Evaluation    ASSESSMENT & PLAN    Impression: 69 y.o. male with bilateral shoulder pain Secondary to shoulder impingement and rotator cuff tendinosis.    Plan:   I reviewed with the patient the nature of their diagnosis.  I reviewed their imaging studies with them.    Based on the history, physical exam and imaging studies above, the patient's presentation is consistent with the above diagnosis.  I had a long discussion with the patient regarding their presentation and the treatment options.  We discussed continued nonoperative versus operative management options as well as potential further diagnostic imaging.  Therefore I recommend continuing with initial nonoperative management starting with physical therapy and subacromial injections. The patient received bilateral subacromial corticosteroid injections as described above which he tolerated well. I provided him with a prescription for physical therapy. He will continue to take over-the-counter anti-inflammatories. He will avoid heavy lifting over his head as to not aggravate his rotator cuff. He will return to see me as needed.       Follow-Up: Patient will follow-up as needed     At the end of the visit, all questions were answered in full. The patient is in agreement with the plan and recommendations. They will call the office with any questions/concerns.    Note dictated with [employer] software. Completed without full typed error editing and sent to avoid delay.     SUBJECTIVE  CHIEF COMPLAINT: Bilateral shoulder pain    HPI: Esteban Holt is a 69 y.o. patient. Esteban Holt complains of bilateral shoulder pain. Patient previously injected in the shoulders 1/29/24. He would like repeat injections.  He notes that he had a slip and fall where he aggravated the shoulders.      Previous HPI 1/29/24   He was injected last about a year ago.  He denies any  new traumatic injury.  He does have pain in both shoulders that he localizes anterior laterally.  He has difficulty with arm extended and overhead activities.  He had good effect from the previous corticosteroid injections and is requesting repeat injections today.    REVIEW OF SYSTEMS  Constitutional: See HPI for pain assessment, No significant weight loss, recent trauma  Cardiovascular: No chest pain, shortness of breath  Respiratory: No difficulty breathing, cough  Gastrointestinal: No nausea, vomiting, diarrhea, constipation  Musculoskeletal: Noted in HPI, positive for pain, restricted motion, stiffness  Integumentary: No rashes, easy bruising, redness   Neurological: no numbness or tingling in extremities, no gait disturbances   Psychiatric: No mood changes, memory changes, social issues  Heme/Lymph: no excessive swelling, easy bruising, excessive bleeding  ENT: No hearing changes  Eyes: No vision changes    Past Medical History:   Diagnosis Date    Chronic sinusitis, unspecified 02/27/2019    Sinobronchitis    Encounter for screening for other viral diseases 09/06/2018    Need for hepatitis C screening test    Hyperglycemia 05/15/2023    Other conditions influencing health status 08/23/2016    History of cough    Personal history of other diseases of the nervous system and sense organs 06/14/2017    History of sciatica    Personal history of other diseases of the respiratory system 02/22/2022    History of bronchitis    Persons encountering health services in other specified circumstances 02/18/2019    Encounter to establish care with new doctor    Pre-diabetes 05/15/2023    Trochanteric bursitis, right hip 09/06/2018    Trochanteric bursitis of right hip        No Known Allergies     Past Surgical History:   Procedure Laterality Date    OTHER SURGICAL HISTORY  02/21/2019    Hernia repair    OTHER SURGICAL HISTORY  02/21/2019    Knee surgery    OTHER SURGICAL HISTORY  02/21/2019    Trigger finger repair     OTHER SURGICAL HISTORY  02/21/2019    Tonsillectomy    OTHER SURGICAL HISTORY  02/21/2019    Plantar fasciotomy        Family History   Problem Relation Name Age of Onset    Dementia Mother      Skin cancer Mother      Coronary artery disease Father      Colon cancer Father          Social History     Socioeconomic History    Marital status:      Spouse name: Not on file    Number of children: Not on file    Years of education: Not on file    Highest education level: Not on file   Occupational History    Not on file   Tobacco Use    Smoking status: Never    Smokeless tobacco: Never   Vaping Use    Vaping status: Never Used   Substance and Sexual Activity    Alcohol use: Never    Drug use: Never    Sexual activity: Defer   Other Topics Concern    Not on file   Social History Narrative    Not on file     Social Drivers of Health     Financial Resource Strain: Not on file   Food Insecurity: Not on file   Transportation Needs: Not on file   Physical Activity: Not on file   Stress: Not on file   Social Connections: Not on file   Intimate Partner Violence: Not on file   Housing Stability: Not on file        CURRENT MEDICATIONS:   Current Outpatient Medications   Medication Sig Dispense Refill    ascorbic acid (Vitamin C) 500 mg tablet Take 2 tablets (1,000 mg) by mouth once daily.      aspirin 81 mg EC tablet Take 1 tablet (81 mg) by mouth once daily.      atorvastatin (Lipitor) 40 mg tablet Take 1 tablet (40 mg) by mouth once daily at bedtime. 90 tablet 3    losartan-hydrochlorothiazide (Hyzaar) 100-12.5 mg tablet TAKE ONE TABLET BY MOUTH EVERY DAY 90 tablet 3    metoprolol tartrate (Lopressor) 50 mg tablet Take 1 tablet by mouth every 12 hours. 180 tablet 3    naproxen (Naprosyn) 500 mg tablet Take 1 tablet (500 mg) by mouth once daily.      sildenafil (Viagra) 100 mg tablet Take by mouth if needed for erectile dysfunction. 1/2-1 tab one hr before      spironolactone (Aldactone) 25 mg tablet Take 1 tablet (25 mg)  "by mouth once daily. 90 tablet 3    vibegron (Gemtesa) 75 mg tablet Take 1 tablet (75 mg) by mouth once daily.       No current facility-administered medications for this visit.        OBJECTIVE    PHYSICAL EXAM      9/26/2023     7:16 AM 10/12/2023     9:00 AM 1/29/2024    10:43 AM 4/2/2024    12:19 PM 5/23/2024    11:53 AM 10/10/2024    12:31 PM 1/6/2025     9:35 AM   Vitals   Systolic  102  110 112 116    Diastolic  62  70 64 62    BP Location      Left arm    Heart Rate  68  62 66 71    Resp    17 16     Height 1.725 m (5' 7.91\")  1.727 m (5' 8\") 1.727 m (5' 8\") 1.727 m (5' 8\") 1.727 m (5' 8\") 1.727 m (5' 8\")   Weight (lb) 233.69 227 227 230 230 234 230   BMI 35.62 kg/m2 34.6 kg/m2 34.52 kg/m2 34.97 kg/m2 34.97 kg/m2 35.58 kg/m2 34.97 kg/m2   BSA (m2) 2.25 m2 2.22 m2 2.22 m2 2.23 m2 2.23 m2 2.26 m2 2.23 m2   Visit Report  Report Report Report Report Report Report      There is no height or weight on file to calculate BMI.    GENERAL: A/Ox3, NAD. Appears healthy, well nourished  PSYCHIATRIC: Mood stable, appropriate memory recall  EYES: EOM intact, no scleral icterus  CARDIOVASCULAR: Palpable peripheral pulses  LUNGS: Breathing non-labored on room air  SKIN: no erythema, rashes, or ecchymosis     Negative Spurling's  Full painless neck range of motion     Detailed examination of the bilateral shoulders demonstrates:  Skin intact  No erythema or warmth  No ecchymosis or soft tissue swelling  Mild TTP AC joint  Positive TTP biceps tendon  Positive TTP posterior lateral acromion  Mild scapular dyskinesia with repetitive forward flexion bilaterally  Range of motion:  Forward flexion 160/165 symmetric  ER 40/45 symmetric  IR upper lumbar symmetric  Positive Barriga and Neer's  Equivocal Jobes with 5-/5 strength and mild pain  ER strength 5-/5 with mild pain  Negative belly press, negative liftoff  Equivocal speeds  Upper extremity motor grossly intact  C5-T1 sensation intact bilaterally  2+ radial pulses " bilaterally  Warm and well-perfused, brisk capillary refill     Imaging: Prior multiple views of the affected bilateral shoulder(s) demonstrate: AC joint osteoarthritis, minimal degenerative change of the glenohumeral joint, no acute osseous abnormality.   X-rays were personally reviewed and interpreted by me.  Radiology reports were reviewed by me as well, if readily available at the time.    L Inj/Asp: bilateral subacromial bursa on 3/31/2025 11:00 AM  Indications: pain  Details: 25 G needle, posterior approach  Medications (Right): 40 mg triamcinolone acetonide 40 mg/mL; 2 mL lidocaine 10 mg/mL (1 %)  Medications (Left): 40 mg triamcinolone acetonide 40 mg/mL; 2 mL lidocaine 10 mg/mL (1 %)  Outcome: tolerated well, no immediate complications  Procedure, treatment alternatives, risks and benefits explained, specific risks discussed. Consent was given by the patient. Immediately prior to procedure a time out was called to verify the correct patient, procedure, equipment, support staff and site/side marked as required. Patient was prepped and draped in the usual sterile fashion.                 Alex Wagner MD  Attending Surgeon    Sports Medicine Orthopaedic Surgery  [facility] [place]  [facility] [employer]

## 2025-04-01 ENCOUNTER — HOSPITAL ENCOUNTER (OUTPATIENT)
Dept: CARDIOLOGY | Facility: HOSPITAL | Age: 70
Discharge: HOME | End: 2025-04-01
Payer: MEDICARE

## 2025-04-01 DIAGNOSIS — I51.7 LVH (LEFT VENTRICULAR HYPERTROPHY): ICD-10-CM

## 2025-04-01 DIAGNOSIS — I77.810 ASCENDING AORTA DILATATION: ICD-10-CM

## 2025-04-01 PROCEDURE — 93306 TTE W/DOPPLER COMPLETE: CPT | Performed by: INTERNAL MEDICINE

## 2025-04-01 PROCEDURE — C8929 TTE W OR WO FOL WCON,DOPPLER: HCPCS

## 2025-04-01 PROCEDURE — 2500000004 HC RX 250 GENERAL PHARMACY W/ HCPCS (ALT 636 FOR OP/ED): Performed by: INTERNAL MEDICINE

## 2025-04-01 RX ADMIN — HUMAN ALBUMIN MICROSPHERES AND PERFLUTREN 0.5 ML: 10; .22 INJECTION, SOLUTION INTRAVENOUS at 14:51

## 2025-04-02 ENCOUNTER — APPOINTMENT (OUTPATIENT)
Dept: CARDIOLOGY | Facility: CLINIC | Age: 70
End: 2025-04-02
Payer: MEDICARE

## 2025-04-02 VITALS
SYSTOLIC BLOOD PRESSURE: 132 MMHG | DIASTOLIC BLOOD PRESSURE: 84 MMHG | BODY MASS INDEX: 34.86 KG/M2 | HEIGHT: 68 IN | HEART RATE: 57 BPM | WEIGHT: 230 LBS

## 2025-04-02 DIAGNOSIS — Z95.5 PRESENCE OF STENT IN ANTERIOR DESCENDING BRANCH OF LEFT CORONARY ARTERY: ICD-10-CM

## 2025-04-02 DIAGNOSIS — I51.7 LVH (LEFT VENTRICULAR HYPERTROPHY): ICD-10-CM

## 2025-04-02 DIAGNOSIS — E66.9 OBESITY WITH BODY MASS INDEX 30 OR GREATER: Primary | ICD-10-CM

## 2025-04-02 DIAGNOSIS — I77.810 ASCENDING AORTA DILATATION: ICD-10-CM

## 2025-04-02 DIAGNOSIS — E78.5 HYPERLIPIDEMIA, UNSPECIFIED HYPERLIPIDEMIA TYPE: ICD-10-CM

## 2025-04-02 DIAGNOSIS — I10 BENIGN ESSENTIAL HYPERTENSION: ICD-10-CM

## 2025-04-02 DIAGNOSIS — I25.10 CORONARY ARTERY DISEASE INVOLVING NATIVE HEART WITHOUT ANGINA PECTORIS, UNSPECIFIED VESSEL OR LESION TYPE: ICD-10-CM

## 2025-04-02 PROBLEM — Q25.49: Status: RESOLVED | Noted: 2024-03-26 | Resolved: 2025-04-02

## 2025-04-02 LAB
AORTIC VALVE MEAN GRADIENT: 4 MMHG
AORTIC VALVE PEAK VELOCITY: 1.59 M/S
ATRIAL RATE: 57 BPM
AV PEAK GRADIENT: 10 MMHG
AVA (PEAK VEL): 2.77 CM2
AVA (VTI): 3.11 CM2
EJECTION FRACTION APICAL 4 CHAMBER: 52.6
EJECTION FRACTION: 53 %
GLOBAL LONGITUDINAL STRAIN: 10.2 %
LEFT ATRIUM VOLUME AREA LENGTH INDEX BSA: 18.1 ML/M2
LEFT VENTRICLE INTERNAL DIMENSION DIASTOLE: 4.59 CM (ref 3.5–6)
LEFT VENTRICULAR OUTFLOW TRACT DIAMETER: 2.18 CM
LV EJECTION FRACTION BIPLANE: 53 %
MITRAL VALVE E/A RATIO: 0.81
MITRAL VALVE E/E' RATIO: 8.33
P AXIS: 47 DEGREES
P OFFSET: 155 MS
P ONSET: 122 MS
PR INTERVAL: 214 MS
Q ONSET: 229 MS
QRS COUNT: 9 BEATS
QRS DURATION: 76 MS
QT INTERVAL: 392 MS
QTC CALCULATION(BAZETT): 381 MS
QTC FREDERICIA: 385 MS
R AXIS: -5 DEGREES
RIGHT VENTRICLE FREE WALL PEAK S': 14.14 CM/S
RIGHT VENTRICLE PEAK SYSTOLIC PRESSURE: 29.8 MMHG
T AXIS: -2 DEGREES
T OFFSET: 425 MS
TRICUSPID ANNULAR PLANE SYSTOLIC EXCURSION: 2 CM
VENTRICULAR RATE: 57 BPM

## 2025-04-02 PROCEDURE — 3079F DIAST BP 80-89 MM HG: CPT | Performed by: INTERNAL MEDICINE

## 2025-04-02 PROCEDURE — 1123F ACP DISCUSS/DSCN MKR DOCD: CPT | Performed by: INTERNAL MEDICINE

## 2025-04-02 PROCEDURE — 99214 OFFICE O/P EST MOD 30 MIN: CPT | Performed by: INTERNAL MEDICINE

## 2025-04-02 PROCEDURE — 3075F SYST BP GE 130 - 139MM HG: CPT | Performed by: INTERNAL MEDICINE

## 2025-04-02 PROCEDURE — 93005 ELECTROCARDIOGRAM TRACING: CPT | Performed by: INTERNAL MEDICINE

## 2025-04-02 PROCEDURE — 1160F RVW MEDS BY RX/DR IN RCRD: CPT | Performed by: INTERNAL MEDICINE

## 2025-04-02 PROCEDURE — 3008F BODY MASS INDEX DOCD: CPT | Performed by: INTERNAL MEDICINE

## 2025-04-02 PROCEDURE — 93010 ELECTROCARDIOGRAM REPORT: CPT | Performed by: INTERNAL MEDICINE

## 2025-04-02 PROCEDURE — 1036F TOBACCO NON-USER: CPT | Performed by: INTERNAL MEDICINE

## 2025-04-02 PROCEDURE — 1159F MED LIST DOCD IN RCRD: CPT | Performed by: INTERNAL MEDICINE

## 2025-04-02 PROCEDURE — 99214 OFFICE O/P EST MOD 30 MIN: CPT | Mod: 25 | Performed by: INTERNAL MEDICINE

## 2025-04-02 RX ORDER — LIDOCAINE HYDROCHLORIDE 10 MG/ML
2 INJECTION, SOLUTION INFILTRATION; PERINEURAL
Status: COMPLETED | OUTPATIENT
Start: 2025-03-31 | End: 2025-03-31

## 2025-04-02 RX ORDER — MIRABEGRON 50 MG/1
1 TABLET, FILM COATED, EXTENDED RELEASE ORAL DAILY
COMMUNITY
Start: 2025-03-12 | End: 2025-04-11

## 2025-04-02 RX ORDER — TRIAMCINOLONE ACETONIDE 40 MG/ML
40 INJECTION, SUSPENSION INTRA-ARTICULAR; INTRAMUSCULAR
Status: COMPLETED | OUTPATIENT
Start: 2025-03-31 | End: 2025-03-31

## 2025-04-02 RX ORDER — TROSPIUM CHLORIDE 20 MG/1
20 TABLET, FILM COATED ORAL EVERY 24 HOURS
COMMUNITY
Start: 2025-03-12

## 2025-04-02 ASSESSMENT — ENCOUNTER SYMPTOMS
DEPRESSION: 0
LOSS OF SENSATION IN FEET: 0
OCCASIONAL FEELINGS OF UNSTEADINESS: 0

## 2025-04-02 NOTE — RESULT ENCOUNTER NOTE
Echo looks good.  The thick heart muscles have gotten better with blood pressure control.  Aorta does not appear significantly enlarged by echo either.

## 2025-04-02 NOTE — PROGRESS NOTES
"Chief Complaint:   Follow-up (annual)     History Of Present Illness:    Esteban Holt is a 69 y.o. male presenting for annual follow up. History of PCI to LAD in 2010 in the setting of angina. Denies chest pain or angina, shortness of breath, orthopnea, PND, palpitation, lightheadedness or loss of consciousness.     Has mild ankle swelling since starting amlodipine, that worsened after increasing this to 10 mg. He gets occasional wheezing but without shortness of breath.  In 2023, will switch the amlodipine to spironolactone and hydrochlorothiazide leading to improvement in the ankle swelling and blood pressure.     He had an echo in October 2023 to monitor ascending aortic aneurysm that did not show any aneurysm however there was moderate degree of LVH with normal LV function.     In 2022, we did an echocardiogram that showed normal LV function, mild posterior LVH and mildly dilated ascending aorta measuring 3.9 cm.    2025 echo showed low normal LV function, no LVH, normal size of aorta.        EKG shows sinus rhythm.  Poor R wave progression unchanged from prior EKGs.  Low voltage complexes.   .     Last Recorded Vitals:  Vitals:    04/02/25 1303   BP: 132/84   BP Location: Left arm   Pulse: 57   Weight: 104 kg (230 lb)   Height: 1.727 m (5' 8\")       Past Medical History:  He has a past medical history of Chronic sinusitis, unspecified (02/27/2019), Encounter for screening for other viral diseases (09/06/2018), Hyperglycemia (05/15/2023), Other conditions influencing health status (08/23/2016), Personal history of other diseases of the nervous system and sense organs (06/14/2017), Personal history of other diseases of the respiratory system (02/22/2022), Persons encountering health services in other specified circumstances (02/18/2019), Pre-diabetes (05/15/2023), and Trochanteric bursitis, right hip (09/06/2018).    Past Surgical History:  He has a past surgical history that includes Other surgical history " (02/21/2019); Other surgical history (02/21/2019); Other surgical history (02/21/2019); Other surgical history (02/21/2019); and Other surgical history (02/21/2019).      Social History:  He reports that he has never smoked. He has never used smokeless tobacco. He reports that he does not drink alcohol and does not use drugs.    Family History:  Family History   Problem Relation Name Age of Onset    Dementia Mother      Skin cancer Mother      Coronary artery disease Father      Colon cancer Father          Allergies:  Patient has no known allergies.    Outpatient Medications:  Current Outpatient Medications   Medication Instructions    ascorbic acid (Vitamin C) 500 mg tablet 2 tablets, Daily    aspirin 81 mg EC tablet 1 tablet, Daily    atorvastatin (LIPITOR) 40 mg, oral, Nightly    losartan-hydrochlorothiazide (Hyzaar) 100-12.5 mg tablet 1 tablet, oral, Daily    metoprolol tartrate (LOPRESSOR) 50 mg, oral, Every 12 hours    mirabegron (Myrbetriq) 50 mg tablet extended release 24 hr 24 hr tablet 1 tablet, Daily    naproxen (NAPROSYN) 500 mg, Daily    sildenafil (Viagra) 100 mg tablet As needed    spironolactone (ALDACTONE) 25 mg, oral, Daily    trospium (SANCTURA) 20 mg, Every 24 hours    vibegron (Gemtesa) 75 mg tablet 1 tablet, Daily       Physical Exam:  Physical Exam  Vitals reviewed.   Constitutional:       Appearance: Normal appearance.   Neck:      Vascular: No carotid bruit or JVD.   Cardiovascular:      Rate and Rhythm: Normal rate and regular rhythm.      Heart sounds: Normal heart sounds, S1 normal and S2 normal. No murmur heard.  Pulmonary:      Effort: Pulmonary effort is normal.      Breath sounds: Normal breath sounds.   Abdominal:      General: Abdomen is flat. Bowel sounds are normal.      Palpations: Abdomen is soft.   Musculoskeletal:      Right lower leg: No edema.      Left lower leg: No edema.   Skin:     General: Skin is warm.   Neurological:      Mental Status: He is alert. Mental status is  at baseline.   Psychiatric:         Mood and Affect: Mood normal.         Behavior: Behavior normal.           Last Labs:  CBC -  Lab Results   Component Value Date    WBC 8.5 05/24/2024    HGB 12.9 (L) 05/24/2024    HCT 40.0 (L) 05/24/2024    MCV 94 05/24/2024     05/24/2024       CMP -  Lab Results   Component Value Date    CALCIUM 9.0 05/24/2024    PROT 5.8 (L) 05/24/2024    ALBUMIN 4.1 05/24/2024    AST 15 05/24/2024    ALT 21 05/24/2024    ALKPHOS 104 05/24/2024    BILITOT 1.4 (H) 05/24/2024       LIPID PANEL -   Lab Results   Component Value Date    CHOL 105 05/24/2024    TRIG 115 05/24/2024    HDL 26.4 05/24/2024    CHHDL 4.0 05/24/2024    LDLF 55 04/14/2023    VLDL 23 05/24/2024    NHDL 79 05/24/2024       RENAL FUNCTION PANEL -   Lab Results   Component Value Date    GLUCOSE 138 (H) 05/24/2024     05/24/2024    K 3.9 05/24/2024     05/24/2024    CO2 28 05/24/2024    ANIONGAP 12 05/24/2024    BUN 19 05/24/2024    CREATININE 0.88 05/24/2024    GFRMALE >90 04/14/2023    CALCIUM 9.0 05/24/2024    ALBUMIN 4.1 05/24/2024        Lab Results   Component Value Date    HGBA1C 7.3 (A) 05/23/2024       Last Cardiology Tests:  ECG:  ECG 12 Lead 04/02/2024      Echo:  Transthoracic Echo (TTE) Complete 04/01/2025      Ejection Fractions:  EF   Date/Time Value Ref Range Status   04/01/2025 03:02 PM 53 %        Cath:  No results found for this or any previous visit from the past 1095 days.      Stress Test:  No results found for this or any previous visit from the past 1095 days.      Cardiac Imaging:  No results found for this or any previous visit from the past 1095 days.          Assessment/Plan   In summary Mr. Holt is a 69-year-old gentleman with coronary artery disease. Currently doing well on medical therapy.      Blood pressure now improved.  Continue current medical therapy.  Blood pressure goals discussed.   CAD-Stable- lipid profile was favorable.  Current management.     He has a mildly  dilated ascending aorta for which we will arrange follow-up with our [facility] in 6 months for monitoring.  This did not appear to be dilated in the echo done in 2023 2025.    He will require potassium level checked in 6 months for which she will follow-up with our [facility]ON[facility].     He has moderate LVH which has subsided with improvement in blood pressure.      Discussed risk-benefit alternatives of Wegovy -benefits including long-term cardiovascular risk reduction up to 20%, weight loss close to 10% in a year, risks being nausea vomiting GI side effects gallstone lack of long-term data and small risk of thyroid cancer as seen in animal models.          Favio Sharp MD

## 2025-04-04 ENCOUNTER — APPOINTMENT (OUTPATIENT)
Dept: CARDIOLOGY | Facility: HOSPITAL | Age: 70
End: 2025-04-04
Payer: MEDICARE

## 2025-05-01 ENCOUNTER — APPOINTMENT (OUTPATIENT)
Dept: PHARMACY | Facility: HOSPITAL | Age: 70
End: 2025-05-01
Payer: MEDICARE

## 2025-05-07 ENCOUNTER — TELEPHONE (OUTPATIENT)
Dept: PRIMARY CARE | Facility: CLINIC | Age: 70
End: 2025-05-07
Payer: MEDICARE

## 2025-05-07 DIAGNOSIS — E78.2 MIXED HYPERLIPIDEMIA: Primary | ICD-10-CM

## 2025-05-07 DIAGNOSIS — R07.81 RIB PAIN: ICD-10-CM

## 2025-05-07 DIAGNOSIS — E11.9 DIABETIC ON DIET ONLY (MULTI): ICD-10-CM

## 2025-05-07 DIAGNOSIS — I10 BENIGN ESSENTIAL HYPERTENSION: ICD-10-CM

## 2025-05-07 DIAGNOSIS — E66.9 OBESITY WITH BODY MASS INDEX 30 OR GREATER: ICD-10-CM

## 2025-05-07 DIAGNOSIS — R35.1 NOCTURIA: ICD-10-CM

## 2025-05-07 NOTE — TELEPHONE ENCOUNTER
Patient is scheduled on 5/27 Does he need to get blood work done? He would like to since he has been sick off and on since Jan. Please notify patient when orders are in.

## 2025-05-14 LAB
ALBUMIN SERPL-MCNC: 4.3 G/DL (ref 3.6–5.1)
ALP SERPL-CCNC: 118 U/L (ref 35–144)
ALT SERPL-CCNC: 30 U/L (ref 9–46)
ANION GAP SERPL CALCULATED.4IONS-SCNC: 12 MMOL/L (CALC) (ref 7–17)
AST SERPL-CCNC: 15 U/L (ref 10–35)
BASOPHILS # BLD AUTO: 61 CELLS/UL (ref 0–200)
BASOPHILS NFR BLD AUTO: 0.6 %
BILIRUB SERPL-MCNC: 1.2 MG/DL (ref 0.2–1.2)
BUN SERPL-MCNC: 21 MG/DL (ref 7–25)
CALCIUM SERPL-MCNC: 9.2 MG/DL (ref 8.6–10.3)
CHLORIDE SERPL-SCNC: 102 MMOL/L (ref 98–110)
CHOLEST SERPL-MCNC: 118 MG/DL
CHOLEST/HDLC SERPL: 3.6 (CALC)
CO2 SERPL-SCNC: 27 MMOL/L (ref 20–32)
CREAT SERPL-MCNC: 1.05 MG/DL (ref 0.7–1.28)
EGFRCR SERPLBLD CKD-EPI 2021: 76 ML/MIN/1.73M2
EOSINOPHIL # BLD AUTO: 131 CELLS/UL (ref 15–500)
EOSINOPHIL NFR BLD AUTO: 1.3 %
ERYTHROCYTE [DISTWIDTH] IN BLOOD BY AUTOMATED COUNT: 12.8 % (ref 11–15)
EST. AVERAGE GLUCOSE BLD GHB EST-MCNC: 209 MG/DL
EST. AVERAGE GLUCOSE BLD GHB EST-SCNC: 11.6 MMOL/L
GLUCOSE SERPL-MCNC: 147 MG/DL (ref 65–99)
HBA1C MFR BLD: 8.9 %
HCT VFR BLD AUTO: 41.2 % (ref 38.5–50)
HDLC SERPL-MCNC: 33 MG/DL
HGB BLD-MCNC: 13.4 G/DL (ref 13.2–17.1)
LDLC SERPL CALC-MCNC: 66 MG/DL (CALC)
LYMPHOCYTES # BLD AUTO: 2414 CELLS/UL (ref 850–3900)
LYMPHOCYTES NFR BLD AUTO: 23.9 %
MCH RBC QN AUTO: 31.4 PG (ref 27–33)
MCHC RBC AUTO-ENTMCNC: 32.5 G/DL (ref 32–36)
MCV RBC AUTO: 96.5 FL (ref 80–100)
MONOCYTES # BLD AUTO: 848 CELLS/UL (ref 200–950)
MONOCYTES NFR BLD AUTO: 8.4 %
NEUTROPHILS # BLD AUTO: 6646 CELLS/UL (ref 1500–7800)
NEUTROPHILS NFR BLD AUTO: 65.8 %
NONHDLC SERPL-MCNC: 85 MG/DL (CALC)
PLATELET # BLD AUTO: 280 THOUSAND/UL (ref 140–400)
PMV BLD REES-ECKER: 9.3 FL (ref 7.5–12.5)
POTASSIUM SERPL-SCNC: 4.6 MMOL/L (ref 3.5–5.3)
PROT SERPL-MCNC: 6.2 G/DL (ref 6.1–8.1)
PSA SERPL-MCNC: 3.76 NG/ML
RBC # BLD AUTO: 4.27 MILLION/UL (ref 4.2–5.8)
SODIUM SERPL-SCNC: 141 MMOL/L (ref 135–146)
TRIGL SERPL-MCNC: 111 MG/DL
TSH SERPL-ACNC: 1.83 MIU/L (ref 0.4–4.5)
WBC # BLD AUTO: 10.1 THOUSAND/UL (ref 3.8–10.8)

## 2025-05-27 ENCOUNTER — APPOINTMENT (OUTPATIENT)
Dept: PRIMARY CARE | Facility: CLINIC | Age: 70
End: 2025-05-27
Payer: MEDICARE

## 2025-05-27 VITALS
RESPIRATION RATE: 16 BRPM | BODY MASS INDEX: 34.56 KG/M2 | OXYGEN SATURATION: 98 % | WEIGHT: 228 LBS | HEIGHT: 68 IN | DIASTOLIC BLOOD PRESSURE: 78 MMHG | SYSTOLIC BLOOD PRESSURE: 118 MMHG | HEART RATE: 84 BPM

## 2025-05-27 DIAGNOSIS — E11.9 TYPE 2 DIABETES MELLITUS WITHOUT COMPLICATION, WITHOUT LONG-TERM CURRENT USE OF INSULIN: ICD-10-CM

## 2025-05-27 DIAGNOSIS — E78.2 MIXED HYPERLIPIDEMIA: ICD-10-CM

## 2025-05-27 DIAGNOSIS — Z00.00 ROUTINE GENERAL MEDICAL EXAMINATION AT HEALTH CARE FACILITY: Primary | ICD-10-CM

## 2025-05-27 DIAGNOSIS — I10 BENIGN ESSENTIAL HYPERTENSION: ICD-10-CM

## 2025-05-27 DIAGNOSIS — Z00.00 ROUTINE GENERAL MEDICAL EXAMINATION AT A HEALTH CARE FACILITY: ICD-10-CM

## 2025-05-27 PROBLEM — N40.0 BPH WITHOUT URINARY OBSTRUCTION: Status: ACTIVE | Noted: 2025-05-27

## 2025-05-27 PROCEDURE — 1036F TOBACCO NON-USER: CPT | Performed by: FAMILY MEDICINE

## 2025-05-27 PROCEDURE — 3078F DIAST BP <80 MM HG: CPT | Performed by: FAMILY MEDICINE

## 2025-05-27 PROCEDURE — 1159F MED LIST DOCD IN RCRD: CPT | Performed by: FAMILY MEDICINE

## 2025-05-27 PROCEDURE — 1170F FXNL STATUS ASSESSED: CPT | Performed by: FAMILY MEDICINE

## 2025-05-27 PROCEDURE — 3074F SYST BP LT 130 MM HG: CPT | Performed by: FAMILY MEDICINE

## 2025-05-27 PROCEDURE — 3008F BODY MASS INDEX DOCD: CPT | Performed by: FAMILY MEDICINE

## 2025-05-27 RX ORDER — METFORMIN HYDROCHLORIDE 500 MG/1
500 TABLET, EXTENDED RELEASE ORAL
Qty: 100 TABLET | Refills: 3 | Status: SHIPPED | OUTPATIENT
Start: 2025-05-27 | End: 2026-07-01

## 2025-05-27 RX ORDER — NAPROXEN 500 MG/1
500 TABLET ORAL DAILY PRN
Status: SHIPPED | COMMUNITY
Start: 2025-05-27

## 2025-05-27 RX ORDER — ISOPROPYL ALCOHOL 70 ML/100ML
1 SWAB TOPICAL DAILY
Qty: 100 EACH | Refills: 3 | Status: SHIPPED | OUTPATIENT
Start: 2025-05-27

## 2025-05-27 RX ORDER — LANCETS
EACH MISCELLANEOUS
Qty: 100 EACH | Refills: 3 | Status: SHIPPED | OUTPATIENT
Start: 2025-05-27

## 2025-05-27 RX ORDER — IBUPROFEN 200 MG
1 CAPSULE ORAL DAILY
Qty: 100 STRIP | Refills: 3 | Status: SHIPPED | OUTPATIENT
Start: 2025-05-27 | End: 2026-05-27

## 2025-05-27 RX ORDER — DEXTROSE 4 G
TABLET,CHEWABLE ORAL
Qty: 1 EACH | Refills: 0 | Status: SHIPPED | OUTPATIENT
Start: 2025-05-27

## 2025-05-27 RX ORDER — METOPROLOL TARTRATE 50 MG/1
50 TABLET ORAL EVERY 12 HOURS
Qty: 180 TABLET | Refills: 3 | Status: SHIPPED | OUTPATIENT
Start: 2025-05-27

## 2025-05-27 RX ORDER — LOSARTAN POTASSIUM 100 MG/1
100 TABLET ORAL DAILY
COMMUNITY
End: 2025-05-27 | Stop reason: ALTCHOICE

## 2025-05-27 RX ORDER — SPIRONOLACTONE 25 MG/1
25 TABLET ORAL DAILY
Qty: 90 TABLET | Refills: 3 | Status: SHIPPED | OUTPATIENT
Start: 2025-05-27

## 2025-05-27 RX ORDER — ATORVASTATIN CALCIUM 40 MG/1
40 TABLET, FILM COATED ORAL NIGHTLY
Qty: 90 TABLET | Refills: 3 | Status: SHIPPED | OUTPATIENT
Start: 2025-05-27

## 2025-05-27 ASSESSMENT — ACTIVITIES OF DAILY LIVING (ADL)
GROCERY_SHOPPING: INDEPENDENT
DOING_HOUSEWORK: INDEPENDENT
TAKING_MEDICATION: INDEPENDENT
DRESSING: INDEPENDENT
BATHING: INDEPENDENT
MANAGING_FINANCES: INDEPENDENT

## 2025-05-27 ASSESSMENT — ANXIETY QUESTIONNAIRES
GAD7 TOTAL SCORE: 0
7. FEELING AFRAID AS IF SOMETHING AWFUL MIGHT HAPPEN: NOT AT ALL
2. NOT BEING ABLE TO STOP OR CONTROL WORRYING: NOT AT ALL
IF YOU CHECKED OFF ANY PROBLEMS ON THIS QUESTIONNAIRE, HOW DIFFICULT HAVE THESE PROBLEMS MADE IT FOR YOU TO DO YOUR WORK, TAKE CARE OF THINGS AT HOME, OR GET ALONG WITH OTHER PEOPLE: NOT DIFFICULT AT ALL
1. FEELING NERVOUS, ANXIOUS, OR ON EDGE: NOT AT ALL
5. BEING SO RESTLESS THAT IT IS HARD TO SIT STILL: NOT AT ALL
6. BECOMING EASILY ANNOYED OR IRRITABLE: NOT AT ALL
4. TROUBLE RELAXING: NOT AT ALL
3. WORRYING TOO MUCH ABOUT DIFFERENT THINGS: NOT AT ALL

## 2025-05-27 ASSESSMENT — PATIENT HEALTH QUESTIONNAIRE - PHQ9
2. FEELING DOWN, DEPRESSED OR HOPELESS: NOT AT ALL
SUM OF ALL RESPONSES TO PHQ9 QUESTIONS 1 AND 2: 0
1. LITTLE INTEREST OR PLEASURE IN DOING THINGS: NOT AT ALL

## 2025-05-27 ASSESSMENT — ENCOUNTER SYMPTOMS
DEPRESSION: 0
FATIGUE: 1
LOSS OF SENSATION IN FEET: 0
OCCASIONAL FEELINGS OF UNSTEADINESS: 0

## 2025-05-27 NOTE — PROGRESS NOTES
"Subjective   Reason for Visit: Esteban Holt is an 69 y.o. male here for a Medicare Wellness visit.     Past Medical, Surgical, and Family History reviewed and updated in chart.    Reviewed all medications by prescribing practitioner or clinical pharmacist (such as prescriptions, OTCs, herbal therapies and supplements) and documented in the medical record.    HPI  Decreased energy, In January he had the flu and since then he has been tired. Went on vacation, gospel music cruise out of [place], slightly cold. Then got URO symptoms again, nausea no vomiting, no diarrhea. Weight has been stable. Labs were reviewed and showd elelvated A1C at 8.9  Patient Care Team:  Trini Trevino MD as PCP - General (Family Medicine)  Favio Sharp MD as Cardiologist (Cardiology) - added spironolactone  Giffen Bladder  Calcei Shoulder and elbow  Review of Systems   Constitutional:  Positive for fatigue.       Objective   Vitals:  /78   Pulse 84   Resp 16   Ht 1.727 m (5' 8\")   Wt 103 kg (228 lb)   SpO2 98%   BMI 34.67 kg/m²       Physical Exam  Vitals reviewed.   Constitutional:       Appearance: Normal appearance.   HENT:      Head: Normocephalic and atraumatic.      Right Ear: Tympanic membrane normal.      Left Ear: Tympanic membrane normal.      Nose: Nose normal.      Mouth/Throat:      Mouth: Mucous membranes are moist.      Pharynx: Oropharynx is clear.   Eyes:      Extraocular Movements: Extraocular movements intact.      Conjunctiva/sclera: Conjunctivae normal.      Pupils: Pupils are equal, round, and reactive to light.   Cardiovascular:      Rate and Rhythm: Normal rate and regular rhythm.      Pulses: Normal pulses.      Heart sounds: Normal heart sounds.   Pulmonary:      Effort: Pulmonary effort is normal.      Breath sounds: Normal breath sounds.   Abdominal:      General: Abdomen is flat. Bowel sounds are normal.      Palpations: Abdomen is soft.   Musculoskeletal:         General: Normal " range of motion.      Cervical back: Normal range of motion and neck supple.   Skin:     General: Skin is warm and dry.      Capillary Refill: Capillary refill takes less than 2 seconds.   Neurological:      General: No focal deficit present.      Mental Status: He is alert and oriented to person, place, and time.   Psychiatric:         Mood and Affect: Mood normal.         Behavior: Behavior normal.         Assessment & Plan  Routine general medical examination at a health care facility    Orders:    Follow Up In [facility] - PCP - Medicare Annual    Mixed hyperlipidemia  atorvastatin  Orders:    atorvastatin (Lipitor) 40 mg tablet; Take 1 tablet (40 mg) by mouth once daily at bedtime.    Benign essential hypertension  Losartan HCTZ  Orders:    metoprolol tartrate (Lopressor) 50 mg tablet; Take 1 tablet by mouth every 12 hours.    spironolactone (Aldactone) 25 mg tablet; Take 1 tablet (25 mg) by mouth once daily.    Routine general medical examination at health care facility    Orders:    1 Year Follow Up In [facility] - PCP - Wellness Exam; Future    Type 2 diabetes mellitus without complication, without long-term current use of insulin    Orders:    metFORMIN XR (Glucophage-XR) 500 mg 24 hr tablet; Take 1 tablet (500 mg) by mouth once daily with breakfast. Do not crush, chew, or split.    empagliflozin (Jardiance) 10 mg tablet; Take 1 tablet (10 mg) by mouth once daily.    blood sugar diagnostic (Blood Glucose Test); 1 strip once daily.    blood-glucose meter misc; For testing once daily    lancets misc; For testing once daily.    alcohol swabs (True Comfort Alcohol Pads); Apply 1 each topically once daily.    Follow Up In [facility] - PCP - Established; Future     New onset diabetes, we will plan to add metformin once daily, and then add jardiance to that. I sent sugar machine to check if you wish.Normal blood sugar is - our goal is to get fasting blood sugar less than 120.    Recheck three months.   Feel free to send any message you wish on My Chart.

## 2025-05-27 NOTE — PATIENT INSTRUCTIONS
New onset diabetes, we will plan to add metformin once daily, and then add jardiance to that. I sent sugar machine to check if you wish.Normal blood sugar is - our goal is to get fasting blood sugar less than 120.   Recheck three months.   Feel free to send any message you wish on My Chart.

## 2025-05-27 NOTE — ASSESSMENT & PLAN NOTE
Losartan HCTZ  Orders:    metoprolol tartrate (Lopressor) 50 mg tablet; Take 1 tablet by mouth every 12 hours.    spironolactone (Aldactone) 25 mg tablet; Take 1 tablet (25 mg) by mouth once daily.

## 2025-05-27 NOTE — ASSESSMENT & PLAN NOTE
atorvastatin  Orders:    atorvastatin (Lipitor) 40 mg tablet; Take 1 tablet (40 mg) by mouth once daily at bedtime.

## 2025-10-01 ENCOUNTER — APPOINTMENT (OUTPATIENT)
Dept: PRIMARY CARE | Facility: CLINIC | Age: 70
End: 2025-10-01
Payer: MEDICARE

## 2026-05-27 ENCOUNTER — APPOINTMENT (OUTPATIENT)
Dept: PRIMARY CARE | Facility: CLINIC | Age: 71
End: 2026-05-27
Payer: MEDICARE